# Patient Record
Sex: MALE | Race: WHITE | NOT HISPANIC OR LATINO | Employment: OTHER | URBAN - METROPOLITAN AREA
[De-identification: names, ages, dates, MRNs, and addresses within clinical notes are randomized per-mention and may not be internally consistent; named-entity substitution may affect disease eponyms.]

---

## 2017-01-30 ENCOUNTER — HOSPITAL ENCOUNTER (OUTPATIENT)
Dept: ONCOLOGY | Facility: CLINIC | Age: 77
Discharge: HOME | End: 2017-01-30
Attending: INTERNAL MEDICINE | Admitting: INTERNAL MEDICINE

## 2017-01-30 LAB
ALBUMIN SERPL-MCNC: 3.7 G/DL (ref 3.5–4.8)
ALBUMIN/GLOB SERPL: 1.3 {RATIO} (ref 1–1.7)
ALP SERPL-CCNC: 85 IU/L (ref 32–91)
ALT SERPL-CCNC: 11 IU/L (ref 17–63)
ANION GAP SERPL CALC-SCNC: 14 MMOL/L (ref 10–20)
AST SERPL-CCNC: 19 IU/L (ref 15–41)
BILIRUB SERPL-MCNC: 0.9 MG/DL (ref 0.3–1.2)
BUN SERPL-MCNC: 10 MG/DL (ref 8–20)
BUN/CREAT SERPL: 7.1 (ref 6.2–20.3)
CALCIUM SERPL-MCNC: 9 MG/DL (ref 8.9–10.3)
CHLORIDE SERPL-SCNC: 105 MMOL/L (ref 101–111)
CONV CO2: 27 MMOL/L (ref 22–32)
CONV TOTAL PROTEIN: 6.5 G/DL (ref 6.1–7.9)
CREAT UR-MCNC: 1.4 MG/DL (ref 0.7–1.2)
GLOBULIN UR ELPH-MCNC: 2.8 G/DL (ref 2.5–3.8)
GLUCOSE SERPL-MCNC: 106 MG/DL (ref 65–99)
POTASSIUM SERPL-SCNC: 4 MMOL/L (ref 3.6–5.1)
SODIUM SERPL-SCNC: 142 MMOL/L (ref 136–144)

## 2017-02-02 ENCOUNTER — HOSPITAL ENCOUNTER (OUTPATIENT)
Dept: ONCOLOGY | Facility: CLINIC | Age: 77
Discharge: HOME | End: 2017-02-02
Attending: INTERNAL MEDICINE | Admitting: INTERNAL MEDICINE

## 2017-02-02 LAB
ALBUMIN SERPL-MCNC: 3.3 G/DL (ref 3.5–4.8)
ALBUMIN/GLOB SERPL: 1.3 {RATIO} (ref 1–1.7)
ALP SERPL-CCNC: 81 IU/L (ref 32–91)
ALT SERPL-CCNC: 11 IU/L (ref 17–63)
ANION GAP SERPL CALC-SCNC: 13.9 MMOL/L (ref 10–20)
AST SERPL-CCNC: 19 IU/L (ref 15–41)
BILIRUB SERPL-MCNC: 1 MG/DL (ref 0.3–1.2)
BUN SERPL-MCNC: 8 MG/DL (ref 8–20)
BUN/CREAT SERPL: 6.2 (ref 6.2–20.3)
CALCIUM SERPL-MCNC: 8.2 MG/DL (ref 8.9–10.3)
CHLORIDE SERPL-SCNC: 106 MMOL/L (ref 101–111)
CONV CO2: 25 MMOL/L (ref 22–32)
CONV TOTAL PROTEIN: 5.9 G/DL (ref 6.1–7.9)
CREAT UR-MCNC: 1.3 MG/DL (ref 0.7–1.2)
GLOBULIN UR ELPH-MCNC: 2.6 G/DL (ref 2.5–3.8)
GLUCOSE SERPL-MCNC: 185 MG/DL (ref 65–99)
POTASSIUM SERPL-SCNC: 3.9 MMOL/L (ref 3.6–5.1)
SODIUM SERPL-SCNC: 141 MMOL/L (ref 136–144)

## 2017-02-28 ENCOUNTER — HOSPITAL ENCOUNTER (OUTPATIENT)
Dept: ONCOLOGY | Facility: CLINIC | Age: 77
Discharge: HOME | End: 2017-02-28
Attending: INTERNAL MEDICINE | Admitting: INTERNAL MEDICINE

## 2017-03-06 ENCOUNTER — HOSPITAL ENCOUNTER (OUTPATIENT)
Dept: ONCOLOGY | Facility: CLINIC | Age: 77
Discharge: HOME | End: 2017-03-06
Attending: INTERNAL MEDICINE | Admitting: INTERNAL MEDICINE

## 2017-03-28 ENCOUNTER — HOSPITAL ENCOUNTER (OUTPATIENT)
Dept: ONCOLOGY | Facility: CLINIC | Age: 77
Discharge: HOME | End: 2017-03-28
Attending: NURSE PRACTITIONER | Admitting: NURSE PRACTITIONER

## 2017-04-06 ENCOUNTER — HOSPITAL ENCOUNTER (OUTPATIENT)
Dept: ONCOLOGY | Facility: CLINIC | Age: 77
Discharge: HOME | End: 2017-04-06
Attending: INTERNAL MEDICINE | Admitting: INTERNAL MEDICINE

## 2017-04-06 LAB
ALBUMIN SERPL-MCNC: 3.5 G/DL (ref 3.5–4.8)
ALBUMIN/GLOB SERPL: 1.5 {RATIO} (ref 1–1.7)
ALP SERPL-CCNC: 73 IU/L (ref 32–91)
ALT SERPL-CCNC: 11 IU/L (ref 17–63)
ANION GAP SERPL CALC-SCNC: 12.7 MMOL/L (ref 10–20)
AST SERPL-CCNC: 16 IU/L (ref 15–41)
BILIRUB SERPL-MCNC: 1.1 MG/DL (ref 0.3–1.2)
BUN SERPL-MCNC: 9 MG/DL (ref 8–20)
BUN/CREAT SERPL: 6.9 (ref 6.2–20.3)
CALCIUM SERPL-MCNC: 8.9 MG/DL (ref 8.9–10.3)
CHLORIDE SERPL-SCNC: 105 MMOL/L (ref 101–111)
CONV CO2: 26 MMOL/L (ref 22–32)
CONV TOTAL PROTEIN: 5.9 G/DL (ref 6.1–7.9)
CREAT UR-MCNC: 1.3 MG/DL (ref 0.7–1.2)
GLOBULIN UR ELPH-MCNC: 2.4 G/DL (ref 2.5–3.8)
GLUCOSE SERPL-MCNC: 115 MG/DL (ref 65–99)
POTASSIUM SERPL-SCNC: 3.7 MMOL/L (ref 3.6–5.1)
SODIUM SERPL-SCNC: 140 MMOL/L (ref 136–144)

## 2017-04-13 ENCOUNTER — HOSPITAL ENCOUNTER (OUTPATIENT)
Dept: ONCOLOGY | Facility: CLINIC | Age: 77
Discharge: HOME | End: 2017-04-13
Attending: INTERNAL MEDICINE | Admitting: INTERNAL MEDICINE

## 2017-04-25 ENCOUNTER — HOSPITAL ENCOUNTER (OUTPATIENT)
Dept: ONCOLOGY | Facility: CLINIC | Age: 77
Discharge: HOME | End: 2017-04-25
Attending: INTERNAL MEDICINE | Admitting: INTERNAL MEDICINE

## 2017-05-30 ENCOUNTER — HOSPITAL ENCOUNTER (OUTPATIENT)
Dept: ONCOLOGY | Facility: CLINIC | Age: 77
Discharge: HOME | End: 2017-05-30
Attending: NURSE PRACTITIONER | Admitting: NURSE PRACTITIONER

## 2017-06-08 ENCOUNTER — HOSPITAL ENCOUNTER (OUTPATIENT)
Dept: ONCOLOGY | Facility: CLINIC | Age: 77
Discharge: HOME | End: 2017-06-08
Attending: INTERNAL MEDICINE | Admitting: INTERNAL MEDICINE

## 2017-06-08 LAB
ALBUMIN SERPL-MCNC: 3.6 G/DL (ref 3.5–4.8)
ALBUMIN/GLOB SERPL: 1.6 {RATIO} (ref 1–1.7)
ALP SERPL-CCNC: 70 IU/L (ref 32–91)
ALT SERPL-CCNC: 12 IU/L (ref 17–63)
ANION GAP SERPL CALC-SCNC: 14.9 MMOL/L (ref 10–20)
AST SERPL-CCNC: 17 IU/L (ref 15–41)
BILIRUB SERPL-MCNC: 0.9 MG/DL (ref 0.3–1.2)
BUN SERPL-MCNC: 12 MG/DL (ref 8–20)
BUN/CREAT SERPL: 10 (ref 6.2–20.3)
CALCIUM SERPL-MCNC: 8.7 MG/DL (ref 8.9–10.3)
CHLORIDE SERPL-SCNC: 104 MMOL/L (ref 101–111)
CONV CO2: 26 MMOL/L (ref 22–32)
CONV TOTAL PROTEIN: 5.9 G/DL (ref 6.1–7.9)
CREAT UR-MCNC: 1.2 MG/DL (ref 0.7–1.2)
GLOBULIN UR ELPH-MCNC: 2.3 G/DL (ref 2.5–3.8)
GLUCOSE SERPL-MCNC: 117 MG/DL (ref 65–99)
POTASSIUM SERPL-SCNC: 3.9 MMOL/L (ref 3.6–5.1)
SODIUM SERPL-SCNC: 141 MMOL/L (ref 136–144)

## 2017-07-07 ENCOUNTER — HOSPITAL ENCOUNTER (OUTPATIENT)
Dept: ONCOLOGY | Facility: CLINIC | Age: 77
Discharge: HOME | End: 2017-07-07
Attending: INTERNAL MEDICINE | Admitting: INTERNAL MEDICINE

## 2017-08-09 ENCOUNTER — HOSPITAL ENCOUNTER (OUTPATIENT)
Dept: ONCOLOGY | Facility: CLINIC | Age: 77
Discharge: HOME | End: 2017-08-09
Attending: INTERNAL MEDICINE | Admitting: INTERNAL MEDICINE

## 2017-08-11 ENCOUNTER — HOSPITAL ENCOUNTER (OUTPATIENT)
Dept: ONCOLOGY | Facility: CLINIC | Age: 77
Discharge: HOME | End: 2017-08-11
Attending: INTERNAL MEDICINE | Admitting: INTERNAL MEDICINE

## 2017-08-11 LAB
ALBUMIN SERPL-MCNC: 3.4 G/DL (ref 3.5–4.8)
ALBUMIN/GLOB SERPL: 1.4 {RATIO} (ref 1–1.7)
ALP SERPL-CCNC: 66 IU/L (ref 32–91)
ALT SERPL-CCNC: 14 IU/L (ref 17–63)
ANION GAP SERPL CALC-SCNC: 12.8 MMOL/L (ref 10–20)
AST SERPL-CCNC: 16 IU/L (ref 15–41)
BILIRUB SERPL-MCNC: 1.2 MG/DL (ref 0.3–1.2)
BUN SERPL-MCNC: 12 MG/DL (ref 8–20)
BUN/CREAT SERPL: 9.2 (ref 6.2–20.3)
CALCIUM SERPL-MCNC: 8.7 MG/DL (ref 8.9–10.3)
CHLORIDE SERPL-SCNC: 105 MMOL/L (ref 101–111)
CONV CO2: 26 MMOL/L (ref 22–32)
CONV TOTAL PROTEIN: 5.9 G/DL (ref 6.1–7.9)
CREAT UR-MCNC: 1.3 MG/DL (ref 0.7–1.2)
GLOBULIN UR ELPH-MCNC: 2.5 G/DL (ref 2.5–3.8)
GLUCOSE SERPL-MCNC: 102 MG/DL (ref 65–99)
POTASSIUM SERPL-SCNC: 3.8 MMOL/L (ref 3.6–5.1)
SODIUM SERPL-SCNC: 140 MMOL/L (ref 136–144)

## 2017-09-13 ENCOUNTER — HOSPITAL ENCOUNTER (OUTPATIENT)
Dept: ONCOLOGY | Facility: CLINIC | Age: 77
Setting detail: INFUSION SERIES
Discharge: HOME | End: 2017-09-13
Attending: INTERNAL MEDICINE | Admitting: INTERNAL MEDICINE

## 2017-09-13 ENCOUNTER — HOSPITAL ENCOUNTER (OUTPATIENT)
Dept: ONCOLOGY | Facility: HOSPITAL | Age: 77
Discharge: HOME | End: 2017-09-13
Attending: INTERNAL MEDICINE | Admitting: INTERNAL MEDICINE

## 2017-09-13 ENCOUNTER — CLINICAL SUPPORT (OUTPATIENT)
Dept: ONCOLOGY | Facility: HOSPITAL | Age: 77
End: 2017-09-13

## 2017-09-13 NOTE — PROGRESS NOTES
PATIENTS ONCOLOGY RECORD LOCATED IN [facility]      Subjective     Name:  LAUREN LEDEZMA     Date:  2017  Address:  [address] [place] [address]  Home: [phone]  :  1940 AGE:  77 y.o.        RECORDS OBTAINED:  Patients Oncology Record is located in [facility]

## 2017-10-03 ENCOUNTER — HOSPITAL ENCOUNTER (OUTPATIENT)
Dept: ONCOLOGY | Facility: HOSPITAL | Age: 77
Discharge: HOME | End: 2017-10-03
Attending: NURSE PRACTITIONER | Admitting: NURSE PRACTITIONER

## 2017-10-03 ENCOUNTER — HOSPITAL ENCOUNTER (OUTPATIENT)
Dept: ONCOLOGY | Facility: CLINIC | Age: 77
Setting detail: INFUSION SERIES
Discharge: HOME | End: 2017-10-03
Attending: NURSE PRACTITIONER | Admitting: NURSE PRACTITIONER

## 2017-10-03 ENCOUNTER — CLINICAL SUPPORT (OUTPATIENT)
Dept: ONCOLOGY | Facility: HOSPITAL | Age: 77
End: 2017-10-03

## 2017-10-03 NOTE — PROGRESS NOTES
PATIENTS ONCOLOGY RECORD LOCATED IN [facility]      Subjective     Name:  LAUREN LEDEZMA     Date:  10/03/2017  Address:  [address] [place] [address]  Home: [phone]  :  1940 AGE:  77 y.o.        RECORDS OBTAINED:  Patients Oncology Record is located in [facility]

## 2017-10-13 ENCOUNTER — HOSPITAL ENCOUNTER (OUTPATIENT)
Dept: ONCOLOGY | Facility: CLINIC | Age: 77
Setting detail: INFUSION SERIES
Discharge: HOME | End: 2017-10-13
Attending: INTERNAL MEDICINE | Admitting: INTERNAL MEDICINE

## 2017-10-13 ENCOUNTER — CLINICAL SUPPORT (OUTPATIENT)
Dept: ONCOLOGY | Facility: HOSPITAL | Age: 77
End: 2017-10-13

## 2017-10-13 ENCOUNTER — HOSPITAL ENCOUNTER (OUTPATIENT)
Dept: ONCOLOGY | Facility: HOSPITAL | Age: 77
Discharge: HOME | End: 2017-10-13
Attending: INTERNAL MEDICINE | Admitting: INTERNAL MEDICINE

## 2017-10-13 LAB
ALBUMIN SERPL-MCNC: 3.5 G/DL (ref 3.5–4.8)
ALBUMIN/GLOB SERPL: 1.4 {RATIO} (ref 1–1.7)
ALP SERPL-CCNC: 71 IU/L (ref 32–91)
ALT SERPL-CCNC: 12 IU/L (ref 17–63)
ANION GAP SERPL CALC-SCNC: 13.1 MMOL/L (ref 10–20)
AST SERPL-CCNC: 18 IU/L (ref 15–41)
BILIRUB SERPL-MCNC: 1.1 MG/DL (ref 0.3–1.2)
BUN SERPL-MCNC: 12 MG/DL (ref 8–20)
BUN/CREAT SERPL: 10 (ref 6.2–20.3)
CALCIUM SERPL-MCNC: 8.7 MG/DL (ref 8.9–10.3)
CHLORIDE SERPL-SCNC: 101 MMOL/L (ref 101–111)
CONV CO2: 27 MMOL/L (ref 22–32)
CONV TOTAL PROTEIN: 6 G/DL (ref 6.1–7.9)
CREAT UR-MCNC: 1.2 MG/DL (ref 0.7–1.2)
GLOBULIN UR ELPH-MCNC: 2.5 G/DL (ref 2.5–3.8)
GLUCOSE SERPL-MCNC: 97 MG/DL (ref 65–99)
POTASSIUM SERPL-SCNC: 4.1 MMOL/L (ref 3.6–5.1)
SODIUM SERPL-SCNC: 137 MMOL/L (ref 136–144)

## 2017-10-13 NOTE — PROGRESS NOTES
PATIENTS ONCOLOGY RECORD LOCATED IN [facility]      Subjective     Name:  LAUREN LEDEZMA     Date:  10/13/2017  Address:  [address] [place] [address]  Home: [phone]  :  1940 AGE:  77 y.o.        RECORDS OBTAINED:  Patients Oncology Record is located in [facility]

## 2017-11-10 ENCOUNTER — HOSPITAL ENCOUNTER (OUTPATIENT)
Dept: ONCOLOGY | Facility: CLINIC | Age: 77
Setting detail: INFUSION SERIES
Discharge: HOME | End: 2017-11-10
Attending: INTERNAL MEDICINE | Admitting: INTERNAL MEDICINE

## 2017-11-10 ENCOUNTER — CLINICAL SUPPORT (OUTPATIENT)
Dept: ONCOLOGY | Facility: HOSPITAL | Age: 77
End: 2017-11-10

## 2017-11-10 ENCOUNTER — HOSPITAL ENCOUNTER (OUTPATIENT)
Dept: ONCOLOGY | Facility: HOSPITAL | Age: 77
Discharge: HOME | End: 2017-11-10
Attending: INTERNAL MEDICINE | Admitting: INTERNAL MEDICINE

## 2017-11-10 NOTE — PROGRESS NOTES
PATIENTS ONCOLOGY RECORD LOCATED IN [facility]      Subjective     Name:  LAUREN LEDEZMA     Date:  11/10/2017  Address:  [address] [place] [address]  Home: [phone]  :  1940 AGE:  77 y.o.        RECORDS OBTAINED:  Patients Oncology Record is located in [facility]

## 2017-12-01 ENCOUNTER — HOSPITAL ENCOUNTER (OUTPATIENT)
Dept: ONCOLOGY | Facility: CLINIC | Age: 77
Setting detail: INFUSION SERIES
Discharge: HOME | End: 2017-12-01
Attending: NURSE PRACTITIONER | Admitting: NURSE PRACTITIONER

## 2017-12-01 ENCOUNTER — HOSPITAL ENCOUNTER (OUTPATIENT)
Dept: ONCOLOGY | Facility: HOSPITAL | Age: 77
Discharge: HOME | End: 2017-12-01
Attending: NURSE PRACTITIONER | Admitting: NURSE PRACTITIONER

## 2017-12-01 ENCOUNTER — CLINICAL SUPPORT (OUTPATIENT)
Dept: ONCOLOGY | Facility: HOSPITAL | Age: 77
End: 2017-12-01

## 2017-12-01 LAB
ALBUMIN SERPL-MCNC: 3.7 G/DL (ref 3.5–4.8)
ALBUMIN/GLOB SERPL: 1.5 {RATIO} (ref 1–1.7)
ALP SERPL-CCNC: 68 IU/L (ref 32–91)
ALT SERPL-CCNC: 13 IU/L (ref 17–63)
ANION GAP SERPL CALC-SCNC: 10.5 MMOL/L (ref 10–20)
AST SERPL-CCNC: 19 IU/L (ref 15–41)
BILIRUB SERPL-MCNC: 1.1 MG/DL (ref 0.3–1.2)
BUN SERPL-MCNC: 9 MG/DL (ref 8–20)
BUN/CREAT SERPL: 6.9 (ref 6.2–20.3)
CALCIUM SERPL-MCNC: 8.8 MG/DL (ref 8.9–10.3)
CHLORIDE SERPL-SCNC: 106 MMOL/L (ref 101–111)
CONV CO2: 25 MMOL/L (ref 22–32)
CONV TOTAL PROTEIN: 6.2 G/DL (ref 6.1–7.9)
CREAT UR-MCNC: 1.3 MG/DL (ref 0.7–1.2)
GLOBULIN UR ELPH-MCNC: 2.5 G/DL (ref 2.5–3.8)
GLUCOSE SERPL-MCNC: 151 MG/DL (ref 65–99)
MAGNESIUM SERPL-MCNC: 1.9 MG/DL (ref 1.8–2.5)
POTASSIUM SERPL-SCNC: 3.5 MMOL/L (ref 3.6–5.1)
SODIUM SERPL-SCNC: 138 MMOL/L (ref 136–144)

## 2017-12-01 NOTE — PROGRESS NOTES
PATIENTS ONCOLOGY RECORD LOCATED IN [facility]      Subjective     Name:  LAUREN LEDEZMA     Date:  2017  Address:  [address] [place] [address]  Home: [phone]  :  1940 AGE:  77 y.o.        RECORDS OBTAINED:  Patients Oncology Record is located in [facility]

## 2017-12-08 ENCOUNTER — CLINICAL SUPPORT (OUTPATIENT)
Dept: ONCOLOGY | Facility: HOSPITAL | Age: 77
End: 2017-12-08

## 2017-12-08 ENCOUNTER — HOSPITAL ENCOUNTER (OUTPATIENT)
Dept: ONCOLOGY | Facility: HOSPITAL | Age: 77
Discharge: HOME | End: 2017-12-08
Attending: INTERNAL MEDICINE | Admitting: INTERNAL MEDICINE

## 2017-12-08 ENCOUNTER — HOSPITAL ENCOUNTER (OUTPATIENT)
Dept: ONCOLOGY | Facility: CLINIC | Age: 77
Setting detail: INFUSION SERIES
Discharge: HOME | End: 2017-12-08
Attending: INTERNAL MEDICINE | Admitting: INTERNAL MEDICINE

## 2017-12-08 LAB
ALBUMIN SERPL-MCNC: 3.8 G/DL (ref 3.5–4.8)
ALBUMIN/GLOB SERPL: 1.8 {RATIO} (ref 1–1.7)
ALP SERPL-CCNC: 69 IU/L (ref 32–91)
ALT SERPL-CCNC: 16 IU/L (ref 17–63)
ANION GAP SERPL CALC-SCNC: 16 MMOL/L (ref 10–20)
AST SERPL-CCNC: 23 IU/L (ref 15–41)
BILIRUB SERPL-MCNC: 1.2 MG/DL (ref 0.3–1.2)
BUN SERPL-MCNC: 10 MG/DL (ref 8–20)
BUN/CREAT SERPL: 8.3 (ref 6.2–20.3)
CALCIUM SERPL-MCNC: 8.8 MG/DL (ref 8.9–10.3)
CHLORIDE SERPL-SCNC: 100 MMOL/L (ref 101–111)
CONV CO2: 24 MMOL/L (ref 22–32)
CONV TOTAL PROTEIN: 5.9 G/DL (ref 6.1–7.9)
CREAT UR-MCNC: 1.2 MG/DL (ref 0.7–1.2)
GLOBULIN UR ELPH-MCNC: 2.1 G/DL (ref 2.5–3.8)
GLUCOSE SERPL-MCNC: 113 MG/DL (ref 65–99)
POTASSIUM SERPL-SCNC: 4 MMOL/L (ref 3.6–5.1)
SODIUM SERPL-SCNC: 136 MMOL/L (ref 136–144)

## 2017-12-08 NOTE — PROGRESS NOTES
PATIENTS ONCOLOGY RECORD LOCATED IN [facility]      Subjective     Name:  LAUREN LEDEZMA     Date:  2017  Address:  [address] [place] [address]  Home: [phone]  :  1940 AGE:  77 y.o.        RECORDS OBTAINED:  Patients Oncology Record is located in [facility]

## 2018-01-15 ENCOUNTER — HOSPITAL ENCOUNTER (OUTPATIENT)
Dept: ONCOLOGY | Facility: HOSPITAL | Age: 78
Discharge: HOME | End: 2018-01-15
Attending: INTERNAL MEDICINE | Admitting: INTERNAL MEDICINE

## 2018-01-15 ENCOUNTER — HOSPITAL ENCOUNTER (OUTPATIENT)
Dept: ONCOLOGY | Facility: CLINIC | Age: 78
Setting detail: INFUSION SERIES
Discharge: HOME | End: 2018-01-15
Attending: INTERNAL MEDICINE | Admitting: INTERNAL MEDICINE

## 2018-01-15 ENCOUNTER — CLINICAL SUPPORT (OUTPATIENT)
Dept: ONCOLOGY | Facility: HOSPITAL | Age: 78
End: 2018-01-15

## 2018-01-15 NOTE — PROGRESS NOTES
PATIENTS ONCOLOGY RECORD LOCATED IN [facility]      Subjective     Name:  LAUREN GHOSH DARIEN     Date:  01/15/2018  Address:  [address] [place] [address]  Home: [phone]  :  1940 AGE:  77 y.o.        RECORDS OBTAINED:  Patients Oncology Record is located in [facility]

## 2018-02-01 ENCOUNTER — CLINICAL SUPPORT (OUTPATIENT)
Dept: ONCOLOGY | Facility: HOSPITAL | Age: 78
End: 2018-02-01

## 2018-02-01 ENCOUNTER — HOSPITAL ENCOUNTER (OUTPATIENT)
Dept: ONCOLOGY | Facility: CLINIC | Age: 78
Setting detail: INFUSION SERIES
Discharge: HOME | End: 2018-02-01
Attending: INTERNAL MEDICINE | Admitting: INTERNAL MEDICINE

## 2018-02-05 ENCOUNTER — CLINICAL SUPPORT (OUTPATIENT)
Dept: ONCOLOGY | Facility: HOSPITAL | Age: 78
End: 2018-02-05

## 2018-02-05 ENCOUNTER — HOSPITAL ENCOUNTER (OUTPATIENT)
Dept: ONCOLOGY | Facility: CLINIC | Age: 78
Setting detail: INFUSION SERIES
Discharge: HOME | End: 2018-02-05
Attending: INTERNAL MEDICINE | Admitting: INTERNAL MEDICINE

## 2018-02-05 NOTE — PROGRESS NOTES
PATIENTS ONCOLOGY RECORD LOCATED IN [facility]      Subjective     Name:  LAUREN GHOSH DARIEN     Date:  2018  Address:   [address] [place] [address]  Home: [phone]  :  1940 AGE:  77 y.o.        RECORDS OBTAINED:  Patients Oncology Record is located in [facility]

## 2018-02-08 ENCOUNTER — HOSPITAL ENCOUNTER (OUTPATIENT)
Dept: ONCOLOGY | Facility: HOSPITAL | Age: 78
Discharge: HOME | End: 2018-02-08
Attending: INTERNAL MEDICINE | Admitting: INTERNAL MEDICINE

## 2018-02-08 ENCOUNTER — CLINICAL SUPPORT (OUTPATIENT)
Dept: ONCOLOGY | Facility: HOSPITAL | Age: 78
End: 2018-02-08

## 2018-02-08 ENCOUNTER — HOSPITAL ENCOUNTER (OUTPATIENT)
Dept: ONCOLOGY | Facility: CLINIC | Age: 78
Setting detail: INFUSION SERIES
Discharge: HOME | End: 2018-02-08
Attending: INTERNAL MEDICINE | Admitting: INTERNAL MEDICINE

## 2018-02-08 LAB
ALBUMIN SERPL-MCNC: 3.6 G/DL (ref 3.5–4.8)
ALBUMIN/GLOB SERPL: 1.5 {RATIO} (ref 1–1.7)
ALP SERPL-CCNC: 68 IU/L (ref 32–91)
ALT SERPL-CCNC: 12 IU/L (ref 17–63)
ANION GAP SERPL CALC-SCNC: 11.6 MMOL/L (ref 10–20)
AST SERPL-CCNC: 17 IU/L (ref 15–41)
BILIRUB SERPL-MCNC: 1.6 MG/DL (ref 0.3–1.2)
BUN SERPL-MCNC: 16 MG/DL (ref 8–20)
BUN/CREAT SERPL: 14.5 (ref 6.2–20.3)
CALCIUM SERPL-MCNC: 8.5 MG/DL (ref 8.9–10.3)
CHLORIDE SERPL-SCNC: 103 MMOL/L (ref 101–111)
CONV CO2: 26 MMOL/L (ref 22–32)
CONV TOTAL PROTEIN: 6 G/DL (ref 6.1–7.9)
CREAT UR-MCNC: 1.1 MG/DL (ref 0.7–1.2)
GLOBULIN UR ELPH-MCNC: 2.4 G/DL (ref 2.5–3.8)
GLUCOSE SERPL-MCNC: 99 MG/DL (ref 65–99)
POTASSIUM SERPL-SCNC: 3.6 MMOL/L (ref 3.6–5.1)
SODIUM SERPL-SCNC: 137 MMOL/L (ref 136–144)

## 2018-02-08 NOTE — PROGRESS NOTES
PATIENTS ONCOLOGY RECORD LOCATED IN [facility]      Subjective     Name:  LAUREN GHOSH DARIEN     Date:  2018  Address:  [address] [place] [address]  Home: [phone]  :  1940 AGE:  77 y.o.        RECORDS OBTAINED:  Patients Oncology Record is located in [facility]

## 2018-02-15 ENCOUNTER — HOSPITAL ENCOUNTER (OUTPATIENT)
Dept: CARDIOLOGY | Facility: HOSPITAL | Age: 78
Discharge: HOME | End: 2018-02-15
Attending: INTERNAL MEDICINE | Admitting: INTERNAL MEDICINE

## 2018-02-28 ENCOUNTER — CLINICAL SUPPORT (OUTPATIENT)
Dept: ONCOLOGY | Facility: HOSPITAL | Age: 78
End: 2018-02-28

## 2018-02-28 ENCOUNTER — HOSPITAL ENCOUNTER (OUTPATIENT)
Dept: ONCOLOGY | Facility: CLINIC | Age: 78
Setting detail: INFUSION SERIES
Discharge: HOME | End: 2018-02-28
Attending: INTERNAL MEDICINE | Admitting: INTERNAL MEDICINE

## 2018-02-28 ENCOUNTER — HOSPITAL ENCOUNTER (OUTPATIENT)
Dept: ONCOLOGY | Facility: CLINIC | Age: 78
Discharge: HOME | End: 2018-02-28
Attending: INTERNAL MEDICINE | Admitting: INTERNAL MEDICINE

## 2018-02-28 NOTE — PROGRESS NOTES
PATIENTS ONCOLOGY RECORD LOCATED IN [facility]      Subjective     Name:  LAUREN GHOSH DARIEN     Date:  2018  Address:  [address] [place] [address]  Home: [phone]  :  1940 AGE:  78 y.o.        RECORDS OBTAINED:  Patients Oncology Record is located in [facility]

## 2018-03-02 ENCOUNTER — CLINICAL SUPPORT (OUTPATIENT)
Dept: ONCOLOGY | Facility: HOSPITAL | Age: 78
End: 2018-03-02

## 2018-03-02 ENCOUNTER — HOSPITAL ENCOUNTER (OUTPATIENT)
Dept: ONCOLOGY | Facility: CLINIC | Age: 78
Setting detail: INFUSION SERIES
Discharge: HOME | End: 2018-03-02
Attending: INTERNAL MEDICINE | Admitting: INTERNAL MEDICINE

## 2018-03-02 NOTE — PROGRESS NOTES
PATIENTS ONCOLOGY RECORD LOCATED IN [facility]      Subjective     Name:  LAUREN GHOSH DARIEN     Date:  2018  Address:  [address] [place] [address]  Home: [phone]  :  1940 AGE:  78 y.o.        RECORDS OBTAINED:  Patients Oncology Record is located in [facility]

## 2018-03-08 ENCOUNTER — HOSPITAL ENCOUNTER (OUTPATIENT)
Dept: ONCOLOGY | Facility: CLINIC | Age: 78
Setting detail: INFUSION SERIES
Discharge: HOME | End: 2018-03-08
Attending: INTERNAL MEDICINE | Admitting: INTERNAL MEDICINE

## 2018-03-08 ENCOUNTER — CLINICAL SUPPORT (OUTPATIENT)
Dept: ONCOLOGY | Facility: HOSPITAL | Age: 78
End: 2018-03-08

## 2018-03-08 NOTE — PROGRESS NOTES
PATIENTS ONCOLOGY RECORD LOCATED IN [facility]      Subjective     Name:  LAUREN GHOSH DARIEN     Date:  2018  Address:  [address] [place] [address]  Home: [phone]  :  1940 AGE:  78 y.o.        RECORDS OBTAINED:  Patients Oncology Record is located in [facility]

## 2018-03-28 ENCOUNTER — HOSPITAL ENCOUNTER (OUTPATIENT)
Dept: ONCOLOGY | Facility: CLINIC | Age: 78
Setting detail: INFUSION SERIES
Discharge: HOME | End: 2018-03-28
Attending: INTERNAL MEDICINE | Admitting: INTERNAL MEDICINE

## 2018-03-28 ENCOUNTER — CLINICAL SUPPORT (OUTPATIENT)
Dept: ONCOLOGY | Facility: HOSPITAL | Age: 78
End: 2018-03-28

## 2018-03-28 NOTE — PROGRESS NOTES
PATIENTS ONCOLOGY RECORD LOCATED IN [facility]      Subjective     Name:  LAUREN GHOSH DARIEN     Date:  2018  Address:  [address] [place] [address]  Home: [phone]  :  1940 AGE:  78 y.o.        RECORDS OBTAINED:  Patients Oncology Record is located in [facility]

## 2018-03-30 ENCOUNTER — CLINICAL SUPPORT (OUTPATIENT)
Dept: ONCOLOGY | Facility: HOSPITAL | Age: 78
End: 2018-03-30

## 2018-03-30 ENCOUNTER — HOSPITAL ENCOUNTER (OUTPATIENT)
Dept: ONCOLOGY | Facility: CLINIC | Age: 78
Setting detail: INFUSION SERIES
Discharge: HOME | End: 2018-03-30
Attending: INTERNAL MEDICINE | Admitting: INTERNAL MEDICINE

## 2018-03-30 NOTE — PROGRESS NOTES
PATIENTS ONCOLOGY RECORD LOCATED IN [facility]      Subjective     Name:  LAUREN GHOSH DARIEN     Date:  2018  Address:  [address] [place] [address]  Home: [phone]  :  1940 AGE:  78 y.o.        RECORDS OBTAINED:  Patients Oncology Record is located in [facility]

## 2018-04-02 ENCOUNTER — CLINICAL SUPPORT (OUTPATIENT)
Dept: ONCOLOGY | Facility: HOSPITAL | Age: 78
End: 2018-04-02

## 2018-04-02 ENCOUNTER — HOSPITAL ENCOUNTER (OUTPATIENT)
Dept: ONCOLOGY | Facility: CLINIC | Age: 78
Setting detail: INFUSION SERIES
Discharge: HOME | End: 2018-04-02
Attending: INTERNAL MEDICINE | Admitting: INTERNAL MEDICINE

## 2018-04-02 NOTE — PROGRESS NOTES
PATIENTS ONCOLOGY RECORD LOCATED IN [facility]      Subjective     Name:  LAUREN GHOSH DARIEN     Date:  2018  Address:   [address] [place] [address]  Home: [phone]  :  1940 AGE:  78 y.o.        RECORDS OBTAINED:  Patients Oncology Record is located in [facility]

## 2018-04-06 ENCOUNTER — HOSPITAL ENCOUNTER (OUTPATIENT)
Dept: ONCOLOGY | Facility: CLINIC | Age: 78
Setting detail: INFUSION SERIES
Discharge: HOME | End: 2018-04-06
Attending: INTERNAL MEDICINE | Admitting: INTERNAL MEDICINE

## 2018-04-06 ENCOUNTER — CLINICAL SUPPORT (OUTPATIENT)
Dept: ONCOLOGY | Facility: HOSPITAL | Age: 78
End: 2018-04-06

## 2018-04-06 ENCOUNTER — HOSPITAL ENCOUNTER (OUTPATIENT)
Dept: ONCOLOGY | Facility: HOSPITAL | Age: 78
Discharge: HOME | End: 2018-04-06
Attending: INTERNAL MEDICINE | Admitting: INTERNAL MEDICINE

## 2018-04-06 LAB
ALBUMIN SERPL-MCNC: 3.6 G/DL (ref 3.5–4.8)
ALBUMIN/GLOB SERPL: 1.6 {RATIO} (ref 1–1.7)
ALP SERPL-CCNC: 64 IU/L (ref 32–91)
ALT SERPL-CCNC: 9 IU/L (ref 17–63)
ANION GAP SERPL CALC-SCNC: 10.8 MMOL/L (ref 10–20)
AST SERPL-CCNC: 17 IU/L (ref 15–41)
BILIRUB SERPL-MCNC: 1.3 MG/DL (ref 0.3–1.2)
BUN SERPL-MCNC: 15 MG/DL (ref 8–20)
BUN/CREAT SERPL: 12.5 (ref 6.2–20.3)
CALCIUM SERPL-MCNC: 8.4 MG/DL (ref 8.9–10.3)
CHLORIDE SERPL-SCNC: 101 MMOL/L (ref 101–111)
CONV CO2: 25 MMOL/L (ref 22–32)
CONV TOTAL PROTEIN: 5.8 G/DL (ref 6.1–7.9)
CREAT UR-MCNC: 1.2 MG/DL (ref 0.7–1.2)
GLOBULIN UR ELPH-MCNC: 2.2 G/DL (ref 2.5–3.8)
GLUCOSE SERPL-MCNC: 132 MG/DL (ref 65–99)
POTASSIUM SERPL-SCNC: 3.8 MMOL/L (ref 3.6–5.1)
SODIUM SERPL-SCNC: 133 MMOL/L (ref 136–144)

## 2018-04-06 NOTE — PROGRESS NOTES
PATIENTS ONCOLOGY RECORD LOCATED IN [facility]      Subjective     Name:  LAUREN GHOSH DARIEN     Date:  2018  Address:   [address] [place] [address]  Home: [phone]  :  1940 AGE:  78 y.o.        RECORDS OBTAINED:  Patients Oncology Record is located in [facility]

## 2018-05-02 ENCOUNTER — CLINICAL SUPPORT (OUTPATIENT)
Dept: ONCOLOGY | Facility: HOSPITAL | Age: 78
End: 2018-05-02

## 2018-05-02 ENCOUNTER — HOSPITAL ENCOUNTER (OUTPATIENT)
Dept: ONCOLOGY | Facility: CLINIC | Age: 78
Setting detail: INFUSION SERIES
Discharge: HOME | End: 2018-05-02
Attending: INTERNAL MEDICINE | Admitting: INTERNAL MEDICINE

## 2018-05-02 NOTE — PROGRESS NOTES
PATIENTS ONCOLOGY RECORD LOCATED IN [facility]      Subjective     Name:  LAUREN GHOSH DARIEN     Date:  2018  Address:   [address] [place] [address]  Home: [phone]  :  1940 AGE:  78 y.o.        RECORDS OBTAINED:  Patients Oncology Record is located in [facility]

## 2018-05-15 ENCOUNTER — HOSPITAL ENCOUNTER (OUTPATIENT)
Dept: ONCOLOGY | Facility: HOSPITAL | Age: 78
Discharge: HOME | End: 2018-05-15
Attending: INTERNAL MEDICINE | Admitting: INTERNAL MEDICINE

## 2018-05-15 ENCOUNTER — CLINICAL SUPPORT (OUTPATIENT)
Dept: ONCOLOGY | Facility: HOSPITAL | Age: 78
End: 2018-05-15

## 2018-05-15 ENCOUNTER — HOSPITAL ENCOUNTER (OUTPATIENT)
Dept: ONCOLOGY | Facility: CLINIC | Age: 78
Setting detail: INFUSION SERIES
Discharge: HOME | End: 2018-05-15
Attending: INTERNAL MEDICINE | Admitting: INTERNAL MEDICINE

## 2018-05-15 LAB
ALBUMIN SERPL-MCNC: 3.7 G/DL (ref 3.5–4.8)
ALBUMIN/GLOB SERPL: 1.5 {RATIO} (ref 1–1.7)
ALP SERPL-CCNC: 80 IU/L (ref 32–91)
ALT SERPL-CCNC: 12 IU/L (ref 17–63)
ANION GAP SERPL CALC-SCNC: 14.7 MMOL/L (ref 10–20)
AST SERPL-CCNC: 18 IU/L (ref 15–41)
BILIRUB SERPL-MCNC: 1 MG/DL (ref 0.3–1.2)
BUN SERPL-MCNC: 15 MG/DL (ref 8–20)
BUN/CREAT SERPL: 12.5 (ref 6.2–20.3)
CALCIUM SERPL-MCNC: 8.8 MG/DL (ref 8.9–10.3)
CHLORIDE SERPL-SCNC: 101 MMOL/L (ref 101–111)
CONV CO2: 24 MMOL/L (ref 22–32)
CONV TOTAL PROTEIN: 6.2 G/DL (ref 6.1–7.9)
CREAT UR-MCNC: 1.2 MG/DL (ref 0.7–1.2)
ERYTHROCYTE [SEDIMENTATION RATE] IN BLOOD BY WESTERGREN METHOD: 22 MM/HR (ref 0–20)
GLOBULIN UR ELPH-MCNC: 2.5 G/DL (ref 2.5–3.8)
GLUCOSE SERPL-MCNC: 108 MG/DL (ref 65–99)
POTASSIUM SERPL-SCNC: 3.7 MMOL/L (ref 3.6–5.1)
SODIUM SERPL-SCNC: 136 MMOL/L (ref 136–144)

## 2018-05-15 NOTE — PROGRESS NOTES
PATIENTS ONCOLOGY RECORD LOCATED IN [facility]      Subjective     Name:  LAUREN GHOSH DARIEN     Date:  05/15/2018  Address:  [address] [place] [address]  Home: [phone]  :  1940 AGE:  78 y.o.        RECORDS OBTAINED:  Patients Oncology Record is located in [email or web address]

## 2018-05-21 ENCOUNTER — HOSPITAL ENCOUNTER (OUTPATIENT)
Dept: ONCOLOGY | Facility: CLINIC | Age: 78
Setting detail: INFUSION SERIES
Discharge: HOME | End: 2018-05-21
Attending: INTERNAL MEDICINE | Admitting: INTERNAL MEDICINE

## 2018-05-21 ENCOUNTER — CLINICAL SUPPORT (OUTPATIENT)
Dept: ONCOLOGY | Facility: HOSPITAL | Age: 78
End: 2018-05-21

## 2018-05-21 NOTE — PROGRESS NOTES
PATIENTS ONCOLOGY RECORD LOCATED IN [facility]      Subjective     Name:  LAUREN GHOSH DARIEN     Date:  2018  Address:   [address] [place] [address]  Home: [phone]  :  1940 AGE:  78 y.o.        RECORDS OBTAINED:  Patients Oncology Record is located in [facility]

## 2018-05-22 ENCOUNTER — HOSPITAL ENCOUNTER (OUTPATIENT)
Dept: OTHER | Facility: HOSPITAL | Age: 78
Setting detail: SPECIMEN
Discharge: HOME | End: 2018-05-22
Attending: OTOLARYNGOLOGY | Admitting: OTOLARYNGOLOGY

## 2018-06-08 ENCOUNTER — CLINICAL SUPPORT (OUTPATIENT)
Dept: ONCOLOGY | Facility: HOSPITAL | Age: 78
End: 2018-06-08

## 2018-06-08 ENCOUNTER — HOSPITAL ENCOUNTER (OUTPATIENT)
Dept: ONCOLOGY | Facility: CLINIC | Age: 78
Setting detail: INFUSION SERIES
Discharge: HOME | End: 2018-06-08
Attending: INTERNAL MEDICINE | Admitting: INTERNAL MEDICINE

## 2018-06-08 NOTE — PROGRESS NOTES
PATIENTS ONCOLOGY RECORD LOCATED IN [facility]      Subjective     Name:  LAUREN AUGIE LEDEZMA     Date:  2018  Address:  [address] [place] [address] DR [address]NA[address]Z [address] [place] [address]  Home: [phone]  :  1940 AGE:  78 y.o.        RECORDS OBTAINED:  Patients Oncology Record is located in [facility]

## 2018-06-19 ENCOUNTER — CLINICAL SUPPORT (OUTPATIENT)
Dept: ONCOLOGY | Facility: HOSPITAL | Age: 78
End: 2018-06-19

## 2018-06-19 ENCOUNTER — HOSPITAL ENCOUNTER (OUTPATIENT)
Dept: ONCOLOGY | Facility: CLINIC | Age: 78
Setting detail: INFUSION SERIES
Discharge: HOME | End: 2018-06-19
Attending: NURSE PRACTITIONER | Admitting: NURSE PRACTITIONER

## 2018-06-19 ENCOUNTER — HOSPITAL ENCOUNTER (OUTPATIENT)
Dept: ONCOLOGY | Facility: HOSPITAL | Age: 78
Discharge: HOME | End: 2018-06-19
Attending: NURSE PRACTITIONER | Admitting: NURSE PRACTITIONER

## 2018-06-19 LAB — ERYTHROCYTE [SEDIMENTATION RATE] IN BLOOD BY WESTERGREN METHOD: 24 MM/HR (ref 0–20)

## 2018-06-19 NOTE — PROGRESS NOTES
PATIENTS ONCOLOGY RECORD LOCATED IN [facility]      Subjective     Name:  LAUREN GHOSH DARIEN     Date:  2018  Address:  [address] [place] [address] DR HERNANDEZ [address] [place] [address]  Home: [phone]  :  1940 AGE:  78 y.o.        RECORDS OBTAINED:  Patients Oncology Record is located in [facility]

## 2018-06-26 ENCOUNTER — HOSPITAL ENCOUNTER (OUTPATIENT)
Dept: ONCOLOGY | Facility: CLINIC | Age: 78
Setting detail: INFUSION SERIES
Discharge: HOME | End: 2018-06-26
Attending: INTERNAL MEDICINE | Admitting: INTERNAL MEDICINE

## 2018-06-26 ENCOUNTER — CLINICAL SUPPORT (OUTPATIENT)
Dept: ONCOLOGY | Facility: HOSPITAL | Age: 78
End: 2018-06-26

## 2018-06-26 NOTE — PROGRESS NOTES
PATIENTS ONCOLOGY RECORD LOCATED IN [facility]      Subjective     Name:  LAUREN GHOSH DARIEN     Date:  2018  Address:  [address] [place] [address] DR HERNANDEZ [address] [place] [address]  Home: [phone]  :  1940 AGE:  78 y.o.        RECORDS OBTAINED:  Patients Oncology Record is located in [facility]

## 2018-07-02 ENCOUNTER — HOSPITAL ENCOUNTER (OUTPATIENT)
Dept: ONCOLOGY | Facility: CLINIC | Age: 78
Setting detail: INFUSION SERIES
Discharge: HOME | End: 2018-07-02
Attending: INTERNAL MEDICINE | Admitting: INTERNAL MEDICINE

## 2018-07-02 ENCOUNTER — CLINICAL SUPPORT (OUTPATIENT)
Dept: ONCOLOGY | Facility: HOSPITAL | Age: 78
End: 2018-07-02

## 2018-07-02 NOTE — PROGRESS NOTES
PATIENTS ONCOLOGY RECORD LOCATED IN [facility] [[email or web address]]      Subjective     Name:  LAUREN AUGIE LEDEZMA     Date:  2018  Address:  [address] [place] [address] DR HERNANDEZ [address] [place] [address]  Home: [phone]  :  1940 AGE:  78 y.o.        RECORDS OBTAINED:  Patients Oncology Record is located in [facility]

## 2018-07-16 ENCOUNTER — HOSPITAL ENCOUNTER (OUTPATIENT)
Dept: ONCOLOGY | Facility: CLINIC | Age: 78
Setting detail: INFUSION SERIES
Discharge: HOME | End: 2018-07-16
Attending: INTERNAL MEDICINE | Admitting: INTERNAL MEDICINE

## 2018-07-16 ENCOUNTER — CLINICAL SUPPORT (OUTPATIENT)
Dept: ONCOLOGY | Facility: HOSPITAL | Age: 78
End: 2018-07-16

## 2018-07-16 NOTE — PROGRESS NOTES
PATIENTS ONCOLOGY RECORD LOCATED IN [facility]      Subjective     Name:  LAUREN AUGIE LEDEZMA     Date:  2018  Address:  [address] [place] [address] DR HERNA[address]Z [address] [place] [address]  Home: [phone]  :  1940 AGE:  78 y.o.        RECORDS OBTAINED:  Patients Oncology Record is located in [facility]

## 2018-07-23 ENCOUNTER — HOSPITAL ENCOUNTER (OUTPATIENT)
Dept: ONCOLOGY | Facility: CLINIC | Age: 78
Setting detail: INFUSION SERIES
Discharge: HOME | End: 2018-07-23
Attending: INTERNAL MEDICINE | Admitting: INTERNAL MEDICINE

## 2018-07-23 ENCOUNTER — CLINICAL SUPPORT (OUTPATIENT)
Dept: ONCOLOGY | Facility: HOSPITAL | Age: 78
End: 2018-07-23

## 2018-07-23 NOTE — PROGRESS NOTES
PATIENTS ONCOLOGY RECORD LOCATED IN [facility] [[email or web address]]      Subjective     Name:  LAUREN AUGIE LEEDZMA     Date:  2018  Address:  [address] [place] [address] DR HERNANDEZ [address] [place] [address]  Home: [phone]  :  1940 AGE:  78 y.o.        RECORDS OBTAINED:  Patients Oncology Record is located in [facility]

## 2018-08-27 ENCOUNTER — CLINICAL SUPPORT (OUTPATIENT)
Dept: ONCOLOGY | Facility: HOSPITAL | Age: 78
End: 2018-08-27

## 2018-08-27 ENCOUNTER — HOSPITAL ENCOUNTER (OUTPATIENT)
Dept: ONCOLOGY | Facility: CLINIC | Age: 78
Setting detail: INFUSION SERIES
Discharge: HOME | End: 2018-08-27
Attending: NURSE PRACTITIONER | Admitting: NURSE PRACTITIONER

## 2018-08-27 NOTE — PROGRESS NOTES
PATIENTS ONCOLOGY RECORD LOCATED IN [facility]      Subjective     Name:  LAUREN GHOSH DARIEN     Date:  2018  Address:  [address] [place] [address] DR HERNA[address]DEZ [address] [place] [address]  Home: [phone]  :  1940 AGE:  78 y.o.        RECORDS OBTAINED:  Patients Oncology Record is located in [facility]

## 2018-10-02 ENCOUNTER — CLINICAL SUPPORT (OUTPATIENT)
Dept: ONCOLOGY | Facility: HOSPITAL | Age: 78
End: 2018-10-02

## 2018-10-02 ENCOUNTER — HOSPITAL ENCOUNTER (OUTPATIENT)
Dept: ONCOLOGY | Facility: HOSPITAL | Age: 78
Discharge: HOME | End: 2018-10-02
Attending: INTERNAL MEDICINE | Admitting: INTERNAL MEDICINE

## 2018-10-02 ENCOUNTER — HOSPITAL ENCOUNTER (OUTPATIENT)
Dept: ONCOLOGY | Facility: CLINIC | Age: 78
Setting detail: INFUSION SERIES
Discharge: HOME | End: 2018-10-02
Attending: INTERNAL MEDICINE | Admitting: INTERNAL MEDICINE

## 2018-10-02 LAB
ALBUMIN SERPL-MCNC: 3.3 G/DL (ref 3.5–4.8)
ALBUMIN/GLOB SERPL: 1.3 {RATIO} (ref 1–1.7)
ALP SERPL-CCNC: 105 IU/L (ref 32–91)
ALT SERPL-CCNC: 16 IU/L (ref 17–63)
ANION GAP SERPL CALC-SCNC: 13.1 MMOL/L (ref 10–20)
AST SERPL-CCNC: 19 IU/L (ref 15–41)
BILIRUB SERPL-MCNC: 1.1 MG/DL (ref 0.3–1.2)
BUN SERPL-MCNC: 16 MG/DL (ref 8–20)
BUN/CREAT SERPL: 13.3 (ref 6.2–20.3)
CALCIUM SERPL-MCNC: 8.4 MG/DL (ref 8.9–10.3)
CHLORIDE SERPL-SCNC: 105 MMOL/L (ref 101–111)
CONV CO2: 25 MMOL/L (ref 22–32)
CONV TOTAL PROTEIN: 5.8 G/DL (ref 6.1–7.9)
CREAT UR-MCNC: 1.2 MG/DL (ref 0.7–1.2)
ERYTHROCYTE [SEDIMENTATION RATE] IN BLOOD BY WESTERGREN METHOD: 15 MM/HR (ref 0–20)
GLOBULIN UR ELPH-MCNC: 2.5 G/DL (ref 2.5–3.8)
GLUCOSE SERPL-MCNC: 143 MG/DL (ref 65–99)
POTASSIUM SERPL-SCNC: 4.1 MMOL/L (ref 3.6–5.1)
SODIUM SERPL-SCNC: 139 MMOL/L (ref 136–144)

## 2018-10-02 NOTE — PROGRESS NOTES
PATIENTS ONCOLOGY RECORD LOCATED IN [facility]      Subjective     Name:  LAUREN GHOSH DARIEN     Date:  10/02/2018  Address:  [address] [place] [address] DR HERNA[address]DEZ [address] [place] [address]  Home: [phone]  :  1940 AGE:  78 y.o.        RECORDS OBTAINED:  Patients Oncology Record is located in [facility]

## 2018-10-31 ENCOUNTER — HOSPITAL ENCOUNTER (OUTPATIENT)
Dept: PREADMISSION TESTING | Facility: HOSPITAL | Age: 78
Discharge: HOME | End: 2018-10-31
Attending: PLASTIC SURGERY | Admitting: PLASTIC SURGERY

## 2018-10-31 LAB
ANION GAP SERPL CALC-SCNC: 12.4 MMOL/L (ref 10–20)
BASOPHILS # BLD AUTO: 0 10*3/UL (ref 0–0.2)
BASOPHILS NFR BLD AUTO: 1 % (ref 0–2)
BUN SERPL-MCNC: 13 MG/DL (ref 8–20)
BUN/CREAT SERPL: 10 (ref 6.2–20.3)
CALCIUM SERPL-MCNC: 9 MG/DL (ref 8.9–10.3)
CHLORIDE SERPL-SCNC: 100 MMOL/L (ref 101–111)
CONV CO2: 27 MMOL/L (ref 22–32)
CREAT UR-MCNC: 1.3 MG/DL (ref 0.7–1.2)
DIFFERENTIAL METHOD BLD: (no result)
EOSINOPHIL # BLD AUTO: 0.1 10*3/UL (ref 0–0.3)
EOSINOPHIL # BLD AUTO: 1 % (ref 0–3)
ERYTHROCYTE [DISTWIDTH] IN BLOOD BY AUTOMATED COUNT: 14.2 % (ref 11.5–14.5)
GLUCOSE SERPL-MCNC: 94 MG/DL (ref 65–99)
HCT VFR BLD AUTO: 42.8 % (ref 40–54)
HGB BLD-MCNC: 14.5 G/DL (ref 14–18)
LYMPHOCYTES # BLD AUTO: 1.5 10*3/UL (ref 0.8–4.8)
LYMPHOCYTES NFR BLD AUTO: 26 % (ref 18–42)
MCH RBC QN AUTO: 34 PG (ref 26–32)
MCHC RBC AUTO-ENTMCNC: 33.8 G/DL (ref 32–36)
MCV RBC AUTO: 100.5 FL (ref 80–94)
MONOCYTES # BLD AUTO: 0.7 10*3/UL (ref 0.1–1.3)
MONOCYTES NFR BLD AUTO: 12 % (ref 2–11)
NEUTROPHILS # BLD AUTO: 3.6 10*3/UL (ref 2.3–8.6)
NEUTROPHILS NFR BLD AUTO: 60 % (ref 50–75)
NRBC BLD AUTO-RTO: 0 /100{WBCS}
NRBC/RBC NFR BLD MANUAL: 0 10*3/UL
PLATELET # BLD AUTO: 119 10*3/UL (ref 150–450)
PMV BLD AUTO: 8.8 FL (ref 7.4–10.4)
POTASSIUM SERPL-SCNC: 4.4 MMOL/L (ref 3.6–5.1)
RBC # BLD AUTO: 4.25 10*6/UL (ref 4.6–6)
SODIUM SERPL-SCNC: 135 MMOL/L (ref 136–144)
WBC # BLD AUTO: 6 10*3/UL (ref 4.5–11.5)

## 2018-11-01 ENCOUNTER — HOSPITAL ENCOUNTER (OUTPATIENT)
Dept: ONCOLOGY | Facility: CLINIC | Age: 78
Setting detail: INFUSION SERIES
Discharge: HOME | End: 2018-11-01
Attending: INTERNAL MEDICINE | Admitting: INTERNAL MEDICINE

## 2018-11-01 ENCOUNTER — CLINICAL SUPPORT (OUTPATIENT)
Dept: ONCOLOGY | Facility: HOSPITAL | Age: 78
End: 2018-11-01

## 2018-11-01 NOTE — PROGRESS NOTES
PATIENTS ONCOLOGY RECORD LOCATED IN [facility]      Subjective     Name:  LAUREN GHOSH DARIEN     Date:  2018  Address:  [address] [place] [address] DR HERNA[address]DEZ [address] [place] [address]  Home: [phone]  :  1940 AGE:  78 y.o.        RECORDS OBTAINED:  Patients Oncology Record is located in [facility]

## 2018-11-09 ENCOUNTER — HOSPITAL ENCOUNTER (OUTPATIENT)
Dept: PREOP | Facility: HOSPITAL | Age: 78
Setting detail: HOSPITAL OUTPATIENT SURGERY
Discharge: HOME | End: 2018-11-09
Attending: PLASTIC SURGERY | Admitting: PLASTIC SURGERY

## 2018-11-09 LAB
APTT BLD: ABNORMAL SEC (ref 24–31)
INR PPP: 1
PROTHROMBIN TIME: 10.4 SEC (ref 9.6–11.7)

## 2018-12-06 ENCOUNTER — CLINICAL SUPPORT (OUTPATIENT)
Dept: ONCOLOGY | Facility: HOSPITAL | Age: 78
End: 2018-12-06

## 2018-12-06 ENCOUNTER — HOSPITAL ENCOUNTER (OUTPATIENT)
Dept: ONCOLOGY | Facility: CLINIC | Age: 78
Setting detail: INFUSION SERIES
Discharge: HOME | End: 2018-12-06
Attending: INTERNAL MEDICINE | Admitting: INTERNAL MEDICINE

## 2018-12-06 NOTE — PROGRESS NOTES
PATIENTS ONCOLOGY RECORD LOCATED IN [facility]      Subjective     Name:  LAUREN GHOSH DARIEN     Date:  2018  Address:  [facility] COUNTRY [facility] DR HERNANDEZ [address] [place] [address] [place] [facility]  Home: [unfilled]  :  1940 AGE:  78 y.o.        RECORDS OBTAINED:  Patients Oncology Record is located in [facility]

## 2019-01-10 ENCOUNTER — CLINICAL SUPPORT (OUTPATIENT)
Dept: ONCOLOGY | Facility: HOSPITAL | Age: 79
End: 2019-01-10

## 2019-01-10 ENCOUNTER — HOSPITAL ENCOUNTER (OUTPATIENT)
Dept: ONCOLOGY | Facility: CLINIC | Age: 79
Setting detail: INFUSION SERIES
Discharge: HOME | End: 2019-01-10
Attending: NURSE PRACTITIONER | Admitting: NURSE PRACTITIONER

## 2019-01-10 NOTE — PROGRESS NOTES
PATIENTS ONCOLOGY RECORD LOCATED IN [facility]      Subjective     Name:  LAUREN GHOSH DARIEN     Date:  01/10/2019  Address:  [facility] COUNTRY [facility] DR HERNANDEZ [address] [place] [address] [place] [facility]  Home: [unfilled]  :  1940 AGE:  78 y.o.        RECORDS OBTAINED:  Patients Oncology Record is located in [facility]

## 2019-02-07 ENCOUNTER — HOSPITAL ENCOUNTER (OUTPATIENT)
Dept: ONCOLOGY | Facility: HOSPITAL | Age: 79
Discharge: HOME | End: 2019-02-07
Attending: INTERNAL MEDICINE | Admitting: INTERNAL MEDICINE

## 2019-02-07 LAB — CREAT BLDA-MCNC: 1.1 MG/DL (ref 0.6–1.3)

## 2019-02-14 ENCOUNTER — CLINICAL SUPPORT (OUTPATIENT)
Dept: ONCOLOGY | Facility: HOSPITAL | Age: 79
End: 2019-02-14

## 2019-02-14 ENCOUNTER — HOSPITAL ENCOUNTER (OUTPATIENT)
Dept: ONCOLOGY | Facility: CLINIC | Age: 79
Setting detail: INFUSION SERIES
Discharge: HOME | End: 2019-02-14
Attending: INTERNAL MEDICINE | Admitting: INTERNAL MEDICINE

## 2019-02-14 NOTE — PROGRESS NOTES
PATIENTS ONCOLOGY RECORD LOCATED IN [facility]      Subjective     Name:  LAUREN GHOSH DARIEN     Date:  2019  Address:  [facility] COUNTRY [facility] DR HERNANDEZ [address] [place] [address] [place] [facility]  Home: [unfilled]  :  1940 AGE:  79 y.o.        RECORDS OBTAINED:  Patients Oncology Record is located in [facility]

## 2019-02-22 ENCOUNTER — CLINICAL SUPPORT (OUTPATIENT)
Dept: ONCOLOGY | Facility: HOSPITAL | Age: 79
End: 2019-02-22

## 2019-02-22 ENCOUNTER — HOSPITAL ENCOUNTER (OUTPATIENT)
Dept: ONCOLOGY | Facility: CLINIC | Age: 79
Setting detail: INFUSION SERIES
Discharge: HOME | End: 2019-02-22
Attending: INTERNAL MEDICINE | Admitting: INTERNAL MEDICINE

## 2019-02-22 NOTE — PROGRESS NOTES
PATIENTS ONCOLOGY RECORD LOCATED IN [facility]      Subjective     Name:  LAUREN GHOSH DARIEN     Date:  2019  Address:  [facility] COUNTRY [facility] DR HERNANDEZ [address] [place] [address] [place] [facility]  Home: [unfilled]  :  1940 AGE:  79 y.o.        RECORDS OBTAINED:  Patients Oncology Record is located in [facility]

## 2019-03-14 ENCOUNTER — HOSPITAL ENCOUNTER (OUTPATIENT)
Dept: ONCOLOGY | Facility: CLINIC | Age: 79
Setting detail: INFUSION SERIES
Discharge: HOME | End: 2019-03-14
Attending: INTERNAL MEDICINE | Admitting: INTERNAL MEDICINE

## 2019-03-14 ENCOUNTER — CLINICAL SUPPORT (OUTPATIENT)
Dept: ONCOLOGY | Facility: HOSPITAL | Age: 79
End: 2019-03-14

## 2019-03-14 NOTE — PROGRESS NOTES
PATIENTS ONCOLOGY RECORD LOCATED IN [facility]      Subjective     Name:  LAUREN GHOSH DARIEN     Date:  2019  Address:  [facility] COUNTRY [facility] DR HERNANDEZ [address] [place] [address] [place] [facility]  Home: [unfilled]  :  1940 AGE:  79 y.o.        RECORDS OBTAINED:  Patients Oncology Record is located in [facility]

## 2019-04-11 ENCOUNTER — CLINICAL SUPPORT (OUTPATIENT)
Dept: ONCOLOGY | Facility: HOSPITAL | Age: 79
End: 2019-04-11

## 2019-04-11 ENCOUNTER — HOSPITAL ENCOUNTER (OUTPATIENT)
Dept: ONCOLOGY | Facility: CLINIC | Age: 79
Discharge: HOME | End: 2019-04-11
Attending: INTERNAL MEDICINE | Admitting: INTERNAL MEDICINE

## 2019-04-11 ENCOUNTER — HOSPITAL ENCOUNTER (OUTPATIENT)
Dept: ONCOLOGY | Facility: CLINIC | Age: 79
Setting detail: INFUSION SERIES
Discharge: HOME | End: 2019-04-11
Attending: INTERNAL MEDICINE | Admitting: INTERNAL MEDICINE

## 2019-04-11 LAB
ALBUMIN SERPL-MCNC: 3.6 G/DL (ref 3.5–4.8)
ALBUMIN/GLOB SERPL: 1.4 {RATIO} (ref 1–1.7)
ALP SERPL-CCNC: 82 IU/L (ref 32–91)
ALT SERPL-CCNC: 14 IU/L (ref 17–63)
ANION GAP SERPL CALC-SCNC: 14.2 MMOL/L (ref 10–20)
AST SERPL-CCNC: 20 IU/L (ref 15–41)
BILIRUB SERPL-MCNC: 1.6 MG/DL (ref 0.3–1.2)
BUN SERPL-MCNC: 13 MG/DL (ref 8–20)
BUN/CREAT SERPL: 10 (ref 6.2–20.3)
CALCIUM SERPL-MCNC: 8.5 MG/DL (ref 8.9–10.3)
CHLORIDE SERPL-SCNC: 105 MMOL/L (ref 101–111)
CONV CO2: 24 MMOL/L (ref 22–32)
CONV TOTAL PROTEIN: 6.1 G/DL (ref 6.1–7.9)
CREAT UR-MCNC: 1.3 MG/DL (ref 0.7–1.2)
ERYTHROCYTE [SEDIMENTATION RATE] IN BLOOD BY WESTERGREN METHOD: 20 MM/HR (ref 0–20)
GLOBULIN UR ELPH-MCNC: 2.5 G/DL (ref 2.5–3.8)
GLUCOSE SERPL-MCNC: 128 MG/DL (ref 65–99)
POTASSIUM SERPL-SCNC: 4.2 MMOL/L (ref 3.6–5.1)
SODIUM SERPL-SCNC: 139 MMOL/L (ref 136–144)

## 2019-04-11 NOTE — PROGRESS NOTES
PATIENTS ONCOLOGY RECORD LOCATED IN [facility]      Subjective     Name:  LAUREN GHOSH DARIEN     Date:  2019  Address:  [facility] COUNTRY [facility] DR HERNANDEZ [address] [place] [address] [place] [facility]  Home: [unfilled]  :  1940 AGE:  79 y.o.        RECORDS OBTAINED:  Patients Oncology Record is located in [facility]

## 2019-05-20 ENCOUNTER — HOSPITAL ENCOUNTER (OUTPATIENT)
Dept: ONCOLOGY | Facility: CLINIC | Age: 79
Setting detail: INFUSION SERIES
Discharge: HOME | End: 2019-05-20
Attending: INTERNAL MEDICINE | Admitting: INTERNAL MEDICINE

## 2019-05-20 ENCOUNTER — CLINICAL SUPPORT (OUTPATIENT)
Dept: ONCOLOGY | Facility: HOSPITAL | Age: 79
End: 2019-05-20

## 2019-05-20 NOTE — PROGRESS NOTES
PATIENTS ONCOLOGY RECORD LOCATED IN [facility]      Subjective     Name:  LAUREN GHOSH DARIEN     Date:  2019  Address:  [facility] COUNTRY [facility] DR HERNANDEZ [address] [place] [address] [place] [facility]  Home: [unfilled]  :  1940 AGE:  79 y.o.        RECORDS OBTAINED:  Patients Oncology Record is located in [facility]

## 2019-05-21 VITALS — BODY MASS INDEX: 39.04 KG/M2 | HEIGHT: 73 IN | WEIGHT: 294.6 LBS

## 2019-05-21 DIAGNOSIS — I27.82 OTHER CHRONIC PULMONARY EMBOLISM WITHOUT ACUTE COR PULMONALE (HCC): Primary | ICD-10-CM

## 2019-05-21 PROBLEM — Z45.2 ENCOUNTER FOR CARE RELATED TO VASCULAR ACCESS PORT: Status: ACTIVE | Noted: 2019-05-21

## 2019-05-21 PROBLEM — I82.5Y9 CHRONIC DEEP VEIN THROMBOSIS (DVT) OF PROXIMAL VEIN OF LOWER EXTREMITY (HCC): Status: ACTIVE | Noted: 2019-05-21

## 2019-05-21 RX ORDER — SODIUM CHLORIDE 0.9 % (FLUSH) 0.9 %
10 SYRINGE (ML) INJECTION AS NEEDED
Status: CANCELLED | OUTPATIENT
Start: 2019-05-21

## 2019-06-17 ENCOUNTER — APPOINTMENT (OUTPATIENT)
Dept: LAB | Facility: HOSPITAL | Age: 79
End: 2019-06-17

## 2019-06-17 ENCOUNTER — HOSPITAL ENCOUNTER (OUTPATIENT)
Dept: ONCOLOGY | Facility: HOSPITAL | Age: 79
Setting detail: INFUSION SERIES
Discharge: HOME | End: 2019-06-17

## 2019-06-17 DIAGNOSIS — C82.90 FOLLICULAR NON-HODGKIN'S LYMPHOMA (HCC): ICD-10-CM

## 2019-06-17 DIAGNOSIS — Z45.2 ENCOUNTER FOR CARE RELATED TO VASCULAR ACCESS PORT: Primary | ICD-10-CM

## 2019-06-17 DIAGNOSIS — I82.5Y9 CHRONIC DEEP VEIN THROMBOSIS (DVT) OF PROXIMAL VEIN OF LOWER EXTREMITY, UNSPECIFIED LATERALITY (HCC): Primary | ICD-10-CM

## 2019-06-17 LAB
INR PPP: 2.1
PROTHROMBIN TIME: 26.1 SECONDS (ref 11–15)

## 2019-06-17 PROCEDURE — 96523 IRRIG DRUG DELIVERY DEVICE: CPT

## 2019-06-17 PROCEDURE — 85610 PROTHROMBIN TIME: CPT

## 2019-06-17 RX ORDER — SODIUM CHLORIDE 0.9 % (FLUSH) 0.9 %
10 SYRINGE (ML) INJECTION AS NEEDED
Status: CANCELLED | OUTPATIENT
Start: 2019-06-17

## 2019-06-17 RX ORDER — SODIUM CHLORIDE 0.9 % (FLUSH) 0.9 %
10 SYRINGE (ML) INJECTION AS NEEDED
Status: DISCONTINUED | OUTPATIENT
Start: 2019-06-17 | End: 2019-06-18 | Stop reason: HOSPADM

## 2019-06-17 RX ADMIN — Medication 30 ML: at 11:43

## 2019-06-19 RX ORDER — PANTOPRAZOLE SODIUM 40 MG/1
40 TABLET, DELAYED RELEASE ORAL DAILY
Qty: 30 TABLET | Refills: 3 | Status: SHIPPED | OUTPATIENT
Start: 2019-06-19 | End: 2019-10-25 | Stop reason: SDUPTHER

## 2019-06-19 RX ORDER — WARFARIN SODIUM 3 MG/1
3 TABLET ORAL DAILY
Refills: 3 | COMMUNITY
Start: 2019-05-21 | End: 2019-06-19 | Stop reason: SDUPTHER

## 2019-06-19 RX ORDER — WARFARIN SODIUM 1 MG/1
1 TABLET ORAL DAILY
Qty: 30 TABLET | Refills: 3 | Status: SHIPPED | OUTPATIENT
Start: 2019-06-19 | End: 2019-08-14 | Stop reason: SDUPTHER

## 2019-06-19 RX ORDER — WARFARIN SODIUM 3 MG/1
3 TABLET ORAL DAILY
Qty: 30 TABLET | Refills: 3 | Status: SHIPPED | OUTPATIENT
Start: 2019-06-19 | End: 2019-08-14 | Stop reason: SDUPTHER

## 2019-06-19 RX ORDER — WARFARIN SODIUM 1 MG/1
1 TABLET ORAL DAILY
Refills: 3 | COMMUNITY
Start: 2019-04-19 | End: 2019-06-19 | Stop reason: SDUPTHER

## 2019-06-19 RX ORDER — PANTOPRAZOLE SODIUM 40 MG/1
40 TABLET, DELAYED RELEASE ORAL DAILY
Refills: 3 | COMMUNITY
Start: 2019-05-16 | End: 2019-06-19 | Stop reason: SDUPTHER

## 2019-06-19 NOTE — TELEPHONE ENCOUNTER
Patient called requesting refill on Warfarin 3 mg, Warfarin 1 mg and Pantoprazole 40  Mg.  [place] [facility]

## 2019-07-11 ENCOUNTER — LAB (OUTPATIENT)
Dept: LAB | Facility: HOSPITAL | Age: 79
End: 2019-07-11

## 2019-07-11 ENCOUNTER — HOSPITAL ENCOUNTER (OUTPATIENT)
Dept: ONCOLOGY | Facility: HOSPITAL | Age: 79
Setting detail: INFUSION SERIES
Discharge: HOME | End: 2019-07-11

## 2019-07-11 ENCOUNTER — TELEPHONE (OUTPATIENT)
Dept: ONCOLOGY | Facility: HOSPITAL | Age: 79
End: 2019-07-11

## 2019-07-11 VITALS
DIASTOLIC BLOOD PRESSURE: 81 MMHG | HEART RATE: 91 BPM | SYSTOLIC BLOOD PRESSURE: 131 MMHG | TEMPERATURE: 97.6 F | HEIGHT: 73 IN | RESPIRATION RATE: 18 BRPM | BODY MASS INDEX: 39.76 KG/M2 | WEIGHT: 300 LBS

## 2019-07-11 DIAGNOSIS — Z45.2 ENCOUNTER FOR CARE RELATED TO VASCULAR ACCESS PORT: Primary | ICD-10-CM

## 2019-07-11 DIAGNOSIS — C82.90 FOLLICULAR NON-HODGKIN'S LYMPHOMA (HCC): ICD-10-CM

## 2019-07-11 DIAGNOSIS — I82.5Y9 CHRONIC DEEP VEIN THROMBOSIS (DVT) OF PROXIMAL VEIN OF LOWER EXTREMITY, UNSPECIFIED LATERALITY (HCC): Primary | ICD-10-CM

## 2019-07-11 LAB
INR PPP: 2.2
PROTHROMBIN TIME: 26.5 SECONDS (ref 11–15)

## 2019-07-11 PROCEDURE — 96523 IRRIG DRUG DELIVERY DEVICE: CPT

## 2019-07-11 PROCEDURE — 85610 PROTHROMBIN TIME: CPT

## 2019-07-11 RX ORDER — SODIUM CHLORIDE 0.9 % (FLUSH) 0.9 %
10 SYRINGE (ML) INJECTION AS NEEDED
Status: CANCELLED | OUTPATIENT
Start: 2019-07-11

## 2019-07-11 RX ORDER — SODIUM CHLORIDE 0.9 % (FLUSH) 0.9 %
10 SYRINGE (ML) INJECTION AS NEEDED
Status: DISCONTINUED | OUTPATIENT
Start: 2019-07-11 | End: 2019-07-12 | Stop reason: HOSPADM

## 2019-07-11 RX ADMIN — Medication 30 ML: at 09:30

## 2019-07-11 RX ADMIN — HEPARIN 500 UNITS: 100 SYRINGE at 09:30

## 2019-07-11 NOTE — ADDENDUM NOTE
Encounter addended by: Sujatha Castaneda RN on: 7/11/2019 9:43 AM   Actions taken: Diagnosis association updated, Order list changed, Actions taken from a BestPractice Advisory, MAR administration accepted

## 2019-07-11 NOTE — ADDENDUM NOTE
Encounter addended by: Sujatha Castaneda RN on: 7/11/2019 9:45 AM   Actions taken: Actions taken from a BestPractice Advisory, MAR administration accepted

## 2019-07-16 PROBLEM — I48.0 PAROXYSMAL ATRIAL FIBRILLATION (HCC): Status: ACTIVE | Noted: 2018-08-13

## 2019-07-16 PROBLEM — E78.5 HYPERLIPIDEMIA: Status: ACTIVE | Noted: 2019-07-16

## 2019-07-16 PROBLEM — I10 HYPERTENSION: Status: ACTIVE | Noted: 2019-07-16

## 2019-07-16 RX ORDER — RANITIDINE 150 MG/1
TABLET ORAL
COMMUNITY
Start: 2015-02-24

## 2019-07-16 RX ORDER — CYANOCOBALAMIN 1000 UG/ML
INJECTION, SOLUTION INTRAMUSCULAR; SUBCUTANEOUS
COMMUNITY
Start: 2018-08-13 | End: 2019-08-14 | Stop reason: SDUPTHER

## 2019-07-16 RX ORDER — ASPIRIN 325 MG
TABLET ORAL
COMMUNITY
Start: 2015-02-24

## 2019-07-16 RX ORDER — METOPROLOL TARTRATE 50 MG/1
TABLET, FILM COATED ORAL EVERY 12 HOURS
COMMUNITY
Start: 2018-08-13

## 2019-07-16 RX ORDER — ESCITALOPRAM OXALATE 20 MG/1
TABLET ORAL EVERY 24 HOURS
COMMUNITY
Start: 2015-02-24

## 2019-07-16 RX ORDER — HYDROCODONE BITARTRATE AND ACETAMINOPHEN 5; 325 MG/1; MG/1
1 TABLET ORAL EVERY 12 HOURS PRN
Refills: 0 | COMMUNITY
Start: 2019-06-26

## 2019-07-16 RX ORDER — ZOLPIDEM TARTRATE 10 MG/1
TABLET ORAL
COMMUNITY
Start: 2015-02-24

## 2019-07-16 RX ORDER — NITROGLYCERIN 0.4 MG/1
TABLET SUBLINGUAL
COMMUNITY
Start: 2015-02-25

## 2019-07-16 RX ORDER — ATORVASTATIN CALCIUM 80 MG/1
TABLET, FILM COATED ORAL EVERY 24 HOURS
COMMUNITY
Start: 2018-08-13

## 2019-07-16 RX ORDER — DOXEPIN HYDROCHLORIDE 100 MG/1
CAPSULE ORAL
COMMUNITY
Start: 2012-10-05

## 2019-07-16 RX ORDER — DIAZEPAM 5 MG/1
TABLET ORAL
COMMUNITY
Start: 2015-02-25

## 2019-07-16 RX ORDER — IPRATROPIUM BROMIDE AND ALBUTEROL SULFATE 2.5; .5 MG/3ML; MG/3ML
SOLUTION RESPIRATORY (INHALATION)
COMMUNITY
Start: 2015-02-25

## 2019-07-16 RX ORDER — OMEGA-3S/DHA/EPA/FISH OIL/D3 300MG-1000
CAPSULE ORAL
COMMUNITY
Start: 2015-02-25

## 2019-07-16 RX ORDER — MECLIZINE HYDROCHLORIDE 25 MG/1
TABLET ORAL
COMMUNITY
Start: 2015-02-25

## 2019-07-16 RX ORDER — FOLIC ACID 1 MG/1
TABLET ORAL
COMMUNITY
Start: 2015-02-24

## 2019-07-16 RX ORDER — LANOLIN ALCOHOL/MO/W.PET/CERES
1 CREAM (GRAM) TOPICAL DAILY
Refills: 7 | COMMUNITY
Start: 2019-06-17 | End: 2019-07-18 | Stop reason: SDUPTHER

## 2019-07-16 RX ORDER — ISOSORBIDE MONONITRATE 60 MG/1
TABLET, EXTENDED RELEASE ORAL EVERY 24 HOURS
COMMUNITY
Start: 2015-02-25

## 2019-07-16 RX ORDER — POTASSIUM CHLORIDE 20 MEQ/1
20 TABLET, EXTENDED RELEASE ORAL DAILY
Refills: 3 | COMMUNITY
Start: 2019-06-17 | End: 2019-08-13 | Stop reason: SDUPTHER

## 2019-07-17 ENCOUNTER — OFFICE VISIT (OUTPATIENT)
Dept: CARDIOLOGY | Facility: CLINIC | Age: 79
End: 2019-07-17

## 2019-07-17 VITALS
HEIGHT: 72 IN | OXYGEN SATURATION: 89 % | DIASTOLIC BLOOD PRESSURE: 87 MMHG | HEART RATE: 70 BPM | SYSTOLIC BLOOD PRESSURE: 128 MMHG | BODY MASS INDEX: 40.9 KG/M2 | WEIGHT: 302 LBS

## 2019-07-17 DIAGNOSIS — I48.20 ATRIAL FIBRILLATION, CHRONIC (HCC): Primary | ICD-10-CM

## 2019-07-17 PROCEDURE — 93000 ELECTROCARDIOGRAM COMPLETE: CPT | Performed by: INTERNAL MEDICINE

## 2019-07-17 PROCEDURE — 99214 OFFICE O/P EST MOD 30 MIN: CPT | Performed by: INTERNAL MEDICINE

## 2019-07-17 NOTE — PROGRESS NOTES
"    Subjective:     Encounter Date:07/17/2019      Patient ID: Caleb Tapia Sr. is a 79 y.o. male.    Chief Complaint: SOB with activity  1 year follow up .   CAD with CABG and coronary stents. Paroxysmal atrial fibrillation. Hx  pulmonary embolus CT chest 2014. Obesity. HTN. DM . Dyslipidemia.  Lymphoma .  SOB with mild activity.    No squeezing chest discomfort such as he had prior to his CABG or coronary stent.  No orthopnea or PND.     Swelling of ankles. No syncope or near syncope.  21 pound weight gain in the past year.    Patient said he had CT of head 2018 and was told by  that  CT showed old stroke.  No transient focal neurologic loss.  Rhythmic jerking of right foot and at times some numbness of right foot     PMH:  Obesity        DM   4/11/2019        Hyperlipidemia        Hypertension        Cig till about 2000 and COPD         dilatation ascending aorta CT chest 2010                Hypothyroidism                Lymphoma treated 2012 and 2013. Probably including radiation supraclavicular area.        WBC 3800 Hgb 13.3 .7 Plts  [id number] 01/25/2018 April 2014 in [facility] for CP and SOB. CT chest “single small filling defect in segmental or subsegmental branch of right lower lobe pulmonary artery.” Venous duplex LE “essentially unremarkable\". Full anticoag warfarin since.                  Chronic left heart failure normal LVEF         PAF                CABG 1996  LIMA to LAD, SVG to diagonal branch  LAD, SVG to marginal branch  Cx, and SVG PDA off  RCA.                 NSTEMI  2009  3.0/24 Taxus stent to the distal segment of SVG to diagonal branch.                4.5/20 BMS to marginal branch of Cx dilated to 5.0 to 5.1 April 2013.                 Heart cath  01/02/2018  LAD  50-70%  mid. Mild #1 diag 50-70 prox. Mod #2 diag 70% ostial/prox. Total mild/mod lat lore Cx. Total prox RCA.   LIMA atretic. SVG to #2 diag  total. SVG lat lore total. SVG to  PDA off RCA " 50%.         Heart cath 03/25/2015                  Echo 12/31/2017 LV OK EF 55-60%        Echo  3/31/2016  LV OK EF 60- 65%.  BA E. AR 4.0 cm                TMT 2/27/15 Ex 3 min at 1.5/10. Prominent dyspnea. EKG low QRS voltage, inf Q waves unsure significance, ST and T wave abn. No further ST seg change or rhythm disturbance with or following ex.                  Holter 01/03/2018 Sinus rhythm . Longest pause 1.3 s.  no ventricular ectopy.  Benign PAC.  2 brief SVT 3 and 9 complexes in duration at 132/min.                CT abdomen pelvis 02/24/2016  retroperitoneal adenopathy concerning for recurrent lymphoma.        CT chest and abdomen/pelvis and soft tissue neck 10/4/2016 no findings of recurrence lymphoma.                 O2 sat overnight 02/16/2018.                Creatinine 1.3 2/2/2017, 1.3  01/25/2018, 1.3 K 4.2 4/11/2019         WBC 3800 Hgb 13.3 .7 Plts  [id number] 01/25/2018                  [facility] Jan 2018  angina pectoris.        .         ECG 12 Lead  Date/Time: 7/17/2019 1:36 PM  Performed by: Víctor Machado MD  Authorized by: Víctor aMchado MD   Comparison: compared with previous ECG from 10/31/2018  Comments:   Atrial fibrillation  Prominent R waves right precordial leads  Anterolateral and possible posterior infarct  ST and T wave abnormality  Abnormal EKG  Compared to EKG 10/31/2018 present EKG had anterolateral infarct and more prominent ST and T wave abnormality               Objective:     Physical Exam   Constitutional:   Weight 302 pounds with 21 pound gain in the past year and 7 pound loss in the past 3.5 years  /80 RA= LA lying and standing  HR 70 regular rhythm  O2 sat 89% room air   Neck: No thyromegaly present.   Cardiovascular:   No carotid bruits  No JVD  No palpable precordial impulses  Decreased heart sounds  No murmur, gallop, rub   Pulmonary/Chest:   Bilateral decreased breath sounds  No wheezing, rhonchi, rales   Abdominal:   Obese  Liver and spleen not  palpable   Musculoskeletal:   Bilateral mild lower extremity edema  Right and left dorsalis pedis pulses +2 and posterior tibial pulses not palpable   Neurological:   Rhythmic slow jerking right foot       Lab Review:     4/11/2019 creatinine 1.3 K 4.2  ALT 14  7/11/2019 INR 2.2  Assessment:     No symptoms suggestive of angina pectoris or SOB with usual activity.    BP satisfactory.  Mild ankle edema    Obese weight.  PAF with rate control with metoprolol and anticoagulation with warfarin.  CABG and  SVG stent and on ASA and statin.  [facility]king of the right foot.     Plan:       Encouraged continued weight reduction and 3 g sodium restriction with handout provided and regular mild exercise with goal 30 minutes 5 days a week.  Continue present medication.     Return appointment 9 mo with EKG.  [facility] and

## 2019-07-18 RX ORDER — LANOLIN ALCOHOL/MO/W.PET/CERES
CREAM (GRAM) TOPICAL
Qty: 30 TABLET | Refills: 7 | Status: SHIPPED | OUTPATIENT
Start: 2019-07-18

## 2019-08-06 DIAGNOSIS — I82.5Y9 CHRONIC DEEP VEIN THROMBOSIS (DVT) OF PROXIMAL VEIN OF LOWER EXTREMITY, UNSPECIFIED LATERALITY (HCC): Primary | ICD-10-CM

## 2019-08-07 ENCOUNTER — APPOINTMENT (OUTPATIENT)
Dept: ONCOLOGY | Facility: CLINIC | Age: 79
End: 2019-08-07

## 2019-08-13 PROBLEM — E53.8 VITAMIN B12 DEFICIENCY: Status: ACTIVE | Noted: 2019-08-13

## 2019-08-13 PROBLEM — D61.818 PANCYTOPENIA (HCC): Status: ACTIVE | Noted: 2019-08-13

## 2019-08-13 NOTE — PROGRESS NOTES
Hematology/Oncology Outpatient Follow Up    PATIENT NAME:Caleb Tapia Sr.  :1940  MRN: [id number]  PRIMARY CARE PHYSICIAN: Wanda Whalen  REFERRING PHYSICIAN: Wanda Whalen MD    Chief Complaint   Patient presents with   • Follow-up     Follicular non-Hodgkin's lymphoma Stage LOLA with recurrence (FLIPI-3), Recurrent DVT’s and PE’s, Vitamin B12 deficiency,  Pancytopenia         HISTORY OF PRESENT ILLNESS:   1. Follicular Non-Hodgkin's lymphoma stage IV diagnosed 2011.  • The patient was hospitalized at [facility] from 10/18/11 to 10/21/11 with a non-ST elevation myocardial infarction and pancytopenia. Hematology consultation was obtained and Dr. Raheem Harrison saw the patient on 10/19/311. CBC revealed WBC of 3,400/mm3, hemoglobin of 13.8 gm/dL and platelet count 78.000/mm3. He had normal left ventricular systolic function with ejection fraction of 55% on an echocardiogram. CT scan of the chest, abdomen and pelvis revealed subcarinal node decreased in size from prior CT scan of 12/15/10. However, the patient had developed posterior mediastinal and retrocrural lymphadenopathy. There were retroperitoneal and mesenteric adenopathy. Increased density in the fat around the mesenteric adenopathy, slightly enlarged right common iliac node and incidental finding of bilateral renal cysts and small fat containing left inguinal hernia. It was recommended for the patient to undergo a cardiac catheterization once his renal function returned to normal, which was running at a serum creatinine of 1.2 to 1.4 mg/dL and he was seen in follow up by me for the first time at the [facility] on 11.  • Patient was evaluated for neutropenia by Dr. Chi Woods in 2008 at the [facility].  He had a normal LAP score and B12, but was found to have a hepatosplenomegaly and developed thrombocytopenia. He underwent bone marrow biopsy of the left iliac crest, which was  insufficient for diagnosis as the specimen consisted mostly of clotted blood containing a few foci of bone marrow cells diluted by sinusoidal blood, but no medullary marrow containing bone.  The bone marrow was read by Dr. Shaun Dozier as normocellular bone marrow with no cytological evidence of myelodysplasia. There were lymphoid aggregates greater than 30% of bone marrow cells, suspicious for lymphoproliferative disorder. Flow cytometry revealed involvement of the bone marrow by a monoclonal B cell population described as Dim positivity for CD10 suggests a lymphoma of follicle center cell origin. Cytogenetics normal. CT scan of the abdomen and pelvis had revealed cysts or hemangiomas in the liver, stable adenopathy in mesentery with mesenteric stranding and paraaortic retroperitoneal adenopathy. Repeat CT scan in March 2011 had revealed borderline splenomegaly.  • 12/19/11 - Sed rate 30 (H). Hepatitis panel negative, Beta 2 Microglobulin 4.56 (H),  (N).  • 12/23/11 - Bone marrow aspiration and biopsy with lymphocytosis and lymphoid infiltration consistent with involvement by follicular lymphoma. Peripheral thrombocytopenia with adequate numbers of bone marrow megakaryocytes. Absent iron stores. Flow cytometry revealed involvement by a B cell non-Hodgkin's lymphoma of follicle center cell. Cytogenetics normal.   • 12/28/11 - PET scan revealed no change in the lymphadenopathy throughout the chest, abdomen, and pelvis consistent with known non-Hodgkin's lymphoma.  Interval development of a small amount of free fluid within the tiny umbilical hernia likely of no significance.  • 1/9/12 - Order written for patient to be started on chemotherapy consisting of Rituxan 750 mg IV day 1, Cytoxan 1500 mg IV day 1, Vincristine 2 mg IV day 1, and Prednisone 100 mg PO daily for five days to cycle every three weeks.  • 1/18/12 - Patient given cycle 1 R-CVP.  • 2/8/12 - Patient given cycle 2 R-CVP.  • 2/9/12 - Sed rate  8.  • 2/29/12 - Patient given cycle 3 R-CVP.  • 3/5/12 - PET scan revealed improvement in the PET from prior study of 12/28/11 with less metabolic activity seen within a left supraclavicular lymph node as well as lymph node posterior mediastinum of the chest in addition to lymphadenopathy within the mesentery/periaortic region. Some decrease in the soft tissue in the root of the mesentery and at least prominent left periaortic retroperitoneal lymph node. Some chronic changes involving the lower lobes of the lungs. Aneurysmal dilatation of the ascending thoracic aorta similar to previous exam. Cyst in the lower pole of the right kidney. Umbilical hernia which appears to contain fat.  • 3/28/12 - Patient given cycle 4 R-CVP.  • 4/18/12 - Patient given cycle 5 R-CVP.  • 5/2/12 - Bone marrow with flow and results showed normal bone marrow. Normocellular bone marrow and absence of iron stores. Flow cytometry negative. Cytogenetics (N).  • 5/9/12 - Patient given cycle 6 R-CVP.  • 5/10/12 - Sedimentation rate 4.  • 5/16/12 - PET/CT scan revealed left supraclavicular enlarged lymph node appeared unchanged with slight decrease in hypermetabolic activity. Mediastinal and retroperitoneal lymph nodes have decreased in size and no longer demonstrate hypermetabolic activity. Ascending thoracic aorta aneurysm measuring 4.9 cm unchanged. 6/20/12 Stool OB x 1 - negative.  • 5/30/12 - Patient given cycle 7 R-CVP.  • 6/20/12 - Patient given cycle 8 R-CVP. Chemotherapy complete. Patient in remission of disease.  • 8/22/12 - Order written for patient to begin on maintenance Rituxan 750 mg IV every two months for two years.  • 9/6/12 - Patient given cycle 1 maintenance Rituxan.  • 10/4/12 - PET scan 1. The previously seen left supraclavicular lymph node has completely resolved. 2. Areas of increased density within the mesenteric fat and retroperitoneal fat have improved. 3. Negative for hypermetabolic foci in the neck, chest, abdomen, or  pelvis to suggest recurrent or progressive disease. 4. New sclerosis in the left oliver-sacrum with a somewhat linear configuration. I suspect this represents a left oliver sacral insufficiency fracture, which is healing. If the patient's clinical presentation does not support this, consider an MRI of the bony pelvis for further evaluation.  • 11/8/12 - Patient given cycle 2 maintenance Rituxan.  • 1/10/13 - Patient given cycle 3 maintenance Rituxan.  • 3/7/13 - Patient given cycle 4 maintenance Rituxan.  • 5/8/13 - Maintenance cycle 5 of Rituxan given.  • 7/11/13 - Maintenance cycle 6 of Rituxan given.  • 9/4/13 - The patient was given maintenance cycle 7 of Rituxan.  • 10/25/13 - CT of the chest, abdomen and pelvis revealed no evidence of adenopathy in the chest, abdomen and pelvis, increase in density in the mesenteric fat and tiny nodes have not increased in the interval.  Bilateral renal cysts and post cholecystectomy.  Stable aneurysmal dilation of the ascending aorta and stable mild aneurysmal dilation of the infrarenal abdominal aorta.  • 11/13/13 - The patient was given maintenance cycle 8 of Rituxan.  • 3/19/14 - The patient was given maintenance cycle 9 of Rituxan.  • 2/20/14 - Sedimentation rate 7 (N)  • 3/19/14 - The patient was given maintenance cycle 10 of Rituxan.  • 5/9/14 - x-rays of the abdomen showed no active cardiopulmonary disease, prior changes of CABG in left sided [id number] placement with tip in the superior right vena cava.  Chest appears stable.  Evaluation of the abdomen was limited due to patient’s body habitus.  However, and no free air or change of ileus or obstruction was seen.  • 5/21/14 - Patient was given maintenance cycle 11 of Rituxan.  • 6/24/14 - Orders written to give last Rituxan treatment next month due to completion of planned treatment course and clear x-rays.  • 7/23/14 - Patient received maintenance cycle 12 of Rituxan.  Treatment discontinued secondary to completion of  planned treatment course.  • 7/24/14 - Sed rate 7 (N).  • 1/19/16 - Comprehensive metabolic panel with no significant abnormality. ESR 14 (<21).    • 2/24/16 - CT abdomen and pelvis with development of retroperitoneal adenopathy. 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 Bilateral renal cysts stable to slightly smaller. CT chest with volume loss in the lung bases with nodular opacities in the lingula and right lung base abutting the mediastinal pleura. Retroperitoneal adenopathy is noted on CT abdomen and pelvis.   • 3/11/16 - PET scan with area of metabolically active adenopathy, predominantly in the retroperitoneum of the abdomen and pelvis. Right supraclavicular node mildly enlarged and metabolically active as well. Within the lingula abutting the pleura, there is a nodular opacity which is metabolically active with 3.45 maximum SUV. Similar-appearing nodular density abutting the right diaphragm in the right middle lobe is present.   • 3/14/16 - Patient complains of shortness of breath on exertion.  ().  • 3/29/16 - CT-guided abdominal lymph node biopsy revealed the presence of follicular lymphoma (grade 1-2, low grade). Patient underwent Infusaport insertion under fluoro guidance by Elda Foote M.D.   • 3/30/16 - Bone marrow aspiration performed revealing apparently normocellular bone marrow (30%) showing trilineage hematopoiesis with scattered megakaryocytes. Flow cytometry had no evidence of increase in blasts or lymphoproliferative disorder. There was decreased CD4/CD8 helper suppressor T-cell ratio and cytogenetics revealed normal karyotype.   • 3/31/16 - Echocardiogram with left ventricular ejection fraction estimated at 60-65% with left atrium and left and right atrium both borderline dilated and aortic root mildly dilated at 4 cm.    • 4/12/16 - IgA 119 (), IgG 1110 (600-1500), IgM 72 (). HIV 1 and 2 antibody negative.      • 4/18/16 - MRI brain with some T2 signal changes  involving both cerebral hemispheres reflecting possibly chronic small vessel ischemic change. There was some underlying cerebral atrophy and some periosteal mucosal thickening along the floor of the left medullary sinus.   • 4/21/16 - Lumbar puncture performed in IR by Víctro Mcdowell M.D. with flow cytometry revealing no monoclonal B-cells identified. Cytology hypocellular with rare scattered lymphocytes. Negative for malignant cells. CSF glucose was 60 (50-80), CSF LDH 30 (0-20), CSF protein was 40 (15-45). Meningitis encephalitis panel was negative. Total cell count was 3 with 2 lymphocytes and 1 monocyte.      • 4/27/16 - Hepatitis B and C screen negative.   • 5/2/16 - Comprehensive metabolic panel with no significant abnormality. Patient started cycle 1 chemotherapy Rituxan 375 mg/M2 IV day 1, Bendeka 90 mg/M2 IV days 1 and 2 to repeat cycle every four weeks.   • 5/31/16 - Patient received cycle 2 chemotherapy.   • 6/9/16 - ESR 12 (<21).    • 6/28/16 - Potassium 3.3 (3.6-5.1). KCL 20 mEq p.o. q. d. started. Patient received cycle 3 chemotherapy.   • 7/6/16 - Magnesium 1.7 (1.8-2.5). Mag Oxide 400 mg p.o. q. d. started.   • 7/11/16 - Patient did not get CT’s done on time as appointments could not be made because of not getting a hold of him due to his phone not working. TSH 1.11 (0.34-5.6), magnesium 1.8 (1.8-2.5).     • 7/14/16 - CT abdomen and pelvis with significant interval improvement from PET scan of 3/11/16. Only small node remaining in the right pelvis, benign by size criteria. Other areas of adenopathy demonstrating only mild streaky density in the mesenteric fat. Marked interval improvement from previous PET study of 3/11/16. CT chest with supraclavicular lymph node seen on PET study of 3/11/16 not imaged on current CT chest due to high position. No evidence of adenopathy or mass within the chest. Basilar scarring changes and/or atelectasis present with no acute findings seen.   • 7/19/16 - Patient  complained of low blood pressure at home. Lisinopril decreased from 40 to 20 mg daily.   • 7/26/16 - Comprehensive metabolic panel with creatinine 1.3 (0.7-1.2).  Platelet count 56,000. Chemotherapy delayed by a week.   • 8/2/16 - Patient received cycle 4 chemotherapy with WBC 3.6, hemoglobin 12.8, platelet count 88,000.   • 8/15/16 - WBC 3.89, hemoglobin 13.3, platelet count 85,000. Chemotherapy dose decreased by 10% because of thrombocytopenia. PSA 1.92 (0-4).    • 9/8/16 - Patient received cycle 5 chemotherapy.   • 9/20/16 - ESR 44 (<21).    • 10/4/16 - CT chest stable from 7/14/16 with no evidence of adenopathy. Mild basilar scarring and/or atelectasis changes were seen again. CT abdomen and pelvis with no evidence of residual or recurrent disease. Small benign node noted in the right pelvis, slightly smaller from exam of 7/14/16 with mild streaky density in the mesenteric fat consistent with response to therapy. CT neck within normal limits with right-sided Infusaport. Prominent cervical node in the base of the neck on prior PET study of 3/11/16 no longer present.   • 10/6/16 - Comprehensive metabolic panel with no significant abnormality. Creatinine 1.2 (0.7-1.2).  Patient received cycle 6 chemotherapy and then discontinued.    • 10/27/16 - WBC 1.9 with 47% neutrophils, 31% lymphocytes, hemoglobin 11.3, .9, platelet count 62,000. Maintenance Rituxan discussed and patient wants to proceed. Order written for Rituxan 375 mg/M2 IV q. two months times two years.   • 12/1/16 - Patient received cycle 1 maintenance Rituxan.   • 12/29/16 - Comprehensive metabolic panel with creatinine 1.6 (0.7-1.2), total bilirubin 1.4 (0.3-1.2), alkaline phosphatase 96 (32-91). Transaminases normal. TSH 3.5 (0.34-5.6).   • 1/30/17 - Rituxan infusions continued. Sed rate 29 high (<21).    • 3/28/17 - WBC 3.3, ANC 1.8 with 56% granulocytes, 28% lymphocytes, 13% monocytes, hemoglobin 13.4, MCV 99.5, platelet count 80,000.    • 4/25/17 - CT of the chest with contrast which showed no adenopathy in the chest, areas of scarring or atelectasis within the lingula and the left lower lobe similar to previous study. No new airspace consolidation or pulmonary nodules. CT of the abdomen and pelvis with contrast with no evidence of abdominal, pelvic or retroperitoneal adenopathy, stable bilateral renal cysts, status post cholecystectomy, colonic diverticulosis without evidence of diverticulitis.   • 5/30/17 - WBC 3.1, hemoglobin 13.2, .8, platelet count 79,000.   • 6/8/17 - Comprehensive metabolic panel with no significant abnormality.   • 8/9/17 - Patient complains of occasional nausea and heartburn. Omeprazole 40 mg q.d. prescribed and maintenance Rituxan continued. Sed rate 3 normal (<21). ESR 3 (<21).   • 8/11/17 - CMP which revealed creatinine of 1.3 high (0.7-1.2) consistent with patient’s CKD. Total calcium 8.7 low (8.9-10.3), total protein 5.9 low (6.1-7.9), albumin 3.4 low (3.5-4.8). Otherwise without clinical significance. Patient also began cycle 5 of maintenance Rituxan.     • 10/3/17 - Cycle 6 Rituximab given.      • 12/1/17 - 10 pound weight loss in two months, intentional, due to dietary changes.   • 12/8/17 - Patient received cycle 7 maintenance Rituxan.   • 12/30/17 - Chest x-ray with cardiomegaly, bilateral basilar scarring, probable emphysema.   • 1/25/18 - CT abdomen and pelvis done in the Emergency Room for abdominal pains with small right adrenal myolipoma and small fat-containing left inguinal hernia.   • 2/28/18 - CT chest with stable areas of bibasilar scarring.  • 4/6/18 - Cycle 9 maintenance rituximab given.  • 4/22/18 - CT head without contrast with no acute intracranial hemorrhage or mass effect.  Patchy periventricular and subcortical white matter low attenuation, statistically representing indeterminate small-vessel ischemic changes with scattered tiny low-density lesions in the supratentorial and  infratentorial brain, likely representing old lacunar infarcts.  Chest x-ray with stable right IJ central venous port catheter and low lung volumes with bibasilar atelectasis and/or scarring.    • 5/15/18 - Patient complains of not feeling well, fatigue, confusion, body aches and swelling with fingers tingling.  He wants to stop maintenance rituximab and Coumadin.  Rituximab discontinued.  Dr. Chang consulted. Echocardiogram and bilateral carotid Dopplers ordered. Encouraged to keep on taking Warfarin while doing further workup.  ESR 22 (H). CMP unremarkable.   • 6/19/18 - ESR 24 (0-20).    • 7/23/18 - Patient claims not to have seen the neurologist yet. Encouraged him to do that.   • 10/2/18 - Patient claims Dr. Machado told him no need to see a neurologist and has not had carotid Dopplers either. Sed rate 15 (0-20).   • 10/31/18 - Chest x-ray showing chronic stable findings without superimposed acute processes.   • 2/7/19 - CT chest, abdomen and pelvis with no evidence of recurrent disease or lymphadenopathy. Mild emphysematous and fibrotic changes in both lungs stable. Dilated ascending aorta measuring up to 4.9 cm in diameter. Postop changes of CABG. Stable 2 cm right adrenal myolipoma and incidental findings of bilateral renal cysts and left inguinal hernia.   • 4/11/2019- creatinine 1.3 (H), total bili 1.6 (H), ESR 20 (N) 6.  2. Recurrent DVT and PE’s diagnosis established in April 2014.  • 6/22/12 - Venous Doppler of the RLE-Doppler was negative.  • 4/1/14 - 4/4/14 - Patient was admitted to [facility] due to pulmonary emboli and thoracic aortic aneurysm which was found on in outpatient CT scan.  Upon CT scan of the chest did show changes consistent with pulmonary emboli and a thoracic aortic aneurysm which were stable.  Venous duplex of the lower extremities were negative.  He was started on Lovenox and Coumadin while in the hospital and was advised to continue the Lovenox and Coumadin until  directed otherwise.  ATIII 105 (N), fibrinogen 451 (H), d-dimer 1.01 (H), prothrombin gene not detected.  Factor V Leiden mutation screen 2.2 (N), protein C 88.5 (N), protein S 33.7 (L),    • 4/5/14 - Advised patient to discontinue Lovenox due to INR within therapeutic range.  INR 2.5  • 4/8/14 - Prothrombin gene not detected.  • 4/9/14 - Patient is currently taking 4 mg of Coumadin daily.  INR 2.8. Given the history of repeat DVT’s and PE’s he should remain on lifelong anticoagulation.    • 2/2/15 - PT INR 27.3/2.3 therapeutic.  Patient is to continue with monthly PT/INR per protocol.  • 1/19/16 - Patient returns to the clinic after almost one year’s absence. Claims was taken off Coumadin for his last cardiac catheterization, but never restarted. Asked to start back on Coumadin 4 mg q. d. and follow INR protocol. Discussed use of newer drugs, but patient would rather stay on Coumadin. Pro-time 11.6, INR 1.0, PTT 26.9 (23.6-35.6). Protein S antigen 121% (), factor X antigen 88% ().     • 4/13/16 - Protein S activity 31 ().    • 12/31/17 - Echocardiogram showed normal left ventricle with ejection fraction 55-60%. No thrombus. Suggestion of impaired LV relaxation. Trace tricuspid regurgitation.   • 8/13/18 - Patient seen in followup by Dr. Machado for chronic heart failure and was continued on Metoprolol as well as seen for paroxysmal AFib. Continued on Coumadin. CAD status post CABG status post PTCA and stent. He was recommended to return for followup visit in nine months.   • 8/27/18 - Carotid Doppler ordered July 2018 not done. Patient reporting Dr. Machado instructed patient it was not necessary.   3. Vitamin B12 deficiency diagnosis originally established in 2009 by Dr. Machado.  • 10/19/11 - Vitamin B12 590 pg/mL (180-914), folate 16.12 ng/mL (340), liver enzymes (N) except bilirubin 1.8 mg/dL (0.3-1.2).  • [facility]  • 12/19/11 - Intrinsic factor blocking  antibody negative, gastric parietal cell antibody normal.  • 1/10/13 - Discussed change from B12 injections to oral B12. The patient wishes to continue B12 injections.  • 1/19/16 - [facility].   • 10/27/16 - [facility] and hence prescription given.   • 12/30/17 - Vitamin B12 of 575 (180-914).    • 10/2/18 - Patient claims to be getting Vitamin B12 injections monthly through the [facility] and does not want to switch to the pills.   • 8/14/2019-hgb 12.7, MCV 99.5.  4. Thrombocytopenia diagnosis established December 2011.  • 12/19/11 - Platelet count 94,000. PT 10.0, INR 1.0, Platelet antibodies negative. PTT 26 (N), MATTHEW negative, CMV IgG 4.50 (H), CMV IgM normal, EBV IgM normal, EBV IgM >5.00 (H).  • 5/30/17 - Platelet count 79,000 which is stable. WBC 3.1 with 61% granulocytes, 23% lymphocytes, 14% monocytes, hemoglobin 13.2 and .8.   • 8/14/2019-platelets 103k.  5. 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  • Port dye study revealed a malpositioned port with associated fibrin sheath. Blood return restored with t-PA.  • 3/7/13 - Patient started on Coumadin 1 mg QD for port maintenance, changed to full dose Coumadin secondary to DVT and PE’s.   Past Medical History:   Diagnosis Date   • Atrial fibrillation (CMS/HCC)    • CKD (chronic kidney disease)    • COPD (chronic obstructive pulmonary disease) (CMS/HCC) 2000   • DJD (degenerative joint disease)    • Dyslipidemia    • Hypertension    • Rheumatoid arthritis (CMS/HCC)        Past Surgical History:   Procedure Laterality Date   • CHOLECYSTECTOMY  1980   • CORONARY ARTERY BYPASS GRAFT  1999   • HERNIA REPAIR  1970   • REPLACEMENT TOTAL KNEE           Current Outpatient Medications:   •  aspirin 325 MG tablet, ASPIRIN 325 MG TABS, Disp: , Rfl:   •  atorvastatin (LIPITOR) 80 MG tablet, Daily., Disp: , Rfl:   •  Calcium Carbonate-Vitamin D  (CALCIUM 500 + D) 500-125 MG-UNIT tablet, CALCIUM 500 + D 500-125 MG-UNIT TABS, Disp: , Rfl:   •  cholecalciferol (VITAMIN D3) 400 units tablet, VITAMIN D3 400 UNIT TABS, Disp: , Rfl:   •  cyanocobalamin 1000 MCG/ML injection, Inject 1 mL into the appropriate muscle as directed by prescriber Every 30 (Thirty) Days., Disp: 3 mL, Rfl: 4  •  diazePAM (VALIUM) 5 MG tablet, DIAZEPAM 5 MG TABS, Disp: , Rfl:   •  docusate sodium (CVS STOOL SOFTENER) 50 MG capsule, CVS STOOL SOFTENER 50 MG CAPS, Disp: , Rfl:   •  doxepin (SINEquan) 100 MG capsule, DOXEPIN  MG CAPS, Disp: , Rfl:   •  escitalopram (LEXAPRO) 20 MG tablet, Daily., Disp: , Rfl:   •  folic acid (FOLVITE) 1 MG tablet, FOLIC ACID 1 MG TABS, Disp: , Rfl:   •  HYDROcodone-acetaminophen (NORCO) 5-325 MG per tablet, Take 1 tablet by mouth Every 12 (Twelve) Hours As Needed., Disp: , Rfl: 0  •  ipratropium-albuterol (DUO-NEB) 0.5-2.5 mg/3 ml nebulizer, IPRATROPIUM-ALBUTEROL 0.5-2.5 (3) MG/3ML SOLN, Disp: , Rfl:   •  isosorbide mononitrate (IMDUR) 60 MG 24 hr tablet, Daily., Disp: , Rfl:   •  Magnesium Oxide 400 (240 Mg) MG tablet, TAKE ONE TABLET BY MOUTH EVERY DAY, Disp: 30 tablet, Rfl: 7  •  meclizine (ANTIVERT) 25 MG tablet, MECLIZINE HCL 25 MG TABS, Disp: , Rfl:   •  metoprolol tartrate (LOPRESSOR) 50 MG tablet, Every 12 (Twelve) Hours., Disp: , Rfl:   •  nitroglycerin (NITROSTAT) 0.4 MG SL tablet, NITROSTAT 0.4 MG SUBL, Disp: , Rfl:   •  pantoprazole (PROTONIX) 40 MG EC tablet, Take 1 tablet by mouth Daily., Disp: 30 tablet, Rfl: 3  •  potassium chloride (K-DUR,KLOR-CON) 20 MEQ CR tablet, TAKE ONE TABLET BY MOUTH EVERY DAY, Disp: 30 tablet, Rfl: 3  •  raNITIdine (ZANTAC) 150 MG tablet, RANITIDINE  MG TABS, Disp: , Rfl:   •  sodium chloride (OCEAN) 0.65 % nasal spray, SODIUM CHLORIDE 0.65 % SOLN, Disp: , Rfl:   •  warfarin (COUMADIN) 1 MG tablet, Take 1 tablet by mouth Daily., Disp: 30 tablet, Rfl: 3  •  warfarin (COUMADIN) 3 MG tablet, Take 1 tablet by  mouth Daily., Disp: 30 tablet, Rfl: 3  •  zolpidem (AMBIEN) 10 MG tablet, AMBIEN 10 MG TABS, Disp: , Rfl:   No current facility-administered medications for this visit.     Facility-Administered Medications Ordered in Other Visits:   •  heparin flush (porcine) 100 UNIT/ML injection 500 Units, 500 Units, Intravenous, PRN, Vikash Burris MD, 500 Units at 19 0822  •  sodium chloride 0.9 % flush 10 mL, 10 mL, Intravenous, PRN, Vikash Burris MD, 30 mL at 19 0821    No Known Allergies    Family History   Problem Relation Age of Onset   • Hyperlipidemia Mother    • Hypertension Mother    • Heart disease Mother    • Diabetes Mother    • Heart attack Mother    • Aneurysm Father    • Aneurysm Sister    • Cancer Son        Cancer-related family history includes Cancer in his son.    Social History     Tobacco Use   • Smoking status: Former Smoker     Last attempt to quit: 1980     Years since quittin.6   • Smokeless tobacco: Never Used   Substance Use Topics   • Alcohol use: No     Frequency: Never   • Drug use: No       I have reviewed the history of present illness, past medical history, family history, social history, lab results, all notes and other records since the patient was last seen on 2019.    SUBJECTIVE:  Patient states that he has gained 10 pounds.  [facility] He states that he has a productive cough and is coughing up light yellow sputum and it has been going on for about 7 months, but on some days he has no cough or sputum.  He had some frequent urination but [facility].  He does not drink any alcohol.  He states that he takes 2 Tylenol a couple times a week.  Dr. Sky is now his cardiologist.  Yamileth Barbosa CMA ([facility]) was present during office visit.           REVIEW OF SYSTEMS:  Review of Systems   Constitutional: Positive for unexpected weight change (gained 10 pounds). Negative for diaphoresis,  "fatigue and fever.   HENT: Negative for congestion and nosebleeds.    Eyes: Negative.    Respiratory: Positive for cough (productive, x 7 months) and shortness of breath.    Cardiovascular: Negative for chest pain and leg swelling.   Gastrointestinal: Positive for constipation. Negative for abdominal pain, blood in stool, diarrhea, nausea and vomiting.   Endocrine: Negative for cold intolerance and heat intolerance.   Genitourinary: Negative for dysuria and hematuria.   Musculoskeletal: Negative for arthralgias and joint swelling.   Skin: Negative for rash and wound.   Neurological: Positive for headaches. Negative for numbness.   Hematological: Does not bruise/bleed easily.   Psychiatric/Behavioral: Negative for confusion. The patient is not nervous/anxious.    All other systems reviewed and are negative.      OBJECTIVE:    Vitals:    08/14/19 0841   BP: 120/77   Pulse: 77   Resp: 20   Temp: 98 °F (36.7 °C)   Weight: (!) 136 kg (300 lb 6.4 oz)   Height: 185.4 cm (73\")   PainSc: 0-No pain       ECOG  (1) Restricted in physically strenuous activity, ambulatory and able to do work of light nature    Physical Exam   Constitutional: He is oriented to person, place, and time. He appears well-developed and well-nourished.   Alone.  Morbid obesity.   HENT:   Head: Normocephalic and atraumatic.   Mouth/Throat: Oropharynx is clear and moist.   Eyes: Conjunctivae, EOM and lids are normal. Pupils are equal, round, and reactive to light.   Neck: Normal range of motion. Neck supple. No thyromegaly present.   Cardiovascular: Normal rate, regular rhythm and normal heart sounds.   No murmur heard.  Pulmonary/Chest: Effort normal and breath sounds normal. No respiratory distress.   Abdominal: Soft. Normal appearance and bowel sounds are normal. He exhibits no distension.   Genitourinary:   Genitourinary Comments: Deferred.   Musculoskeletal: Normal range of motion. He exhibits edema (1+ or RLE, Trace on LLE).   Lymphadenopathy:     " He has no cervical adenopathy.     He has no axillary adenopathy.        Right: No supraclavicular adenopathy present.        Left: No supraclavicular adenopathy present.   Neurological: He is alert and oriented to person, place, and time.   Skin: Skin is warm and dry. Capillary refill takes less than 2 seconds. No bruising, no petechiae and no rash noted.   Tattoos bilateral arms   Psychiatric: He has a normal mood and affect. His speech is normal and behavior is normal. Judgment and thought content normal. Cognition and memory are normal.   Nursing note and vitals reviewed.      RECENT LABS  WBC   Date Value Ref Range Status   08/14/2019 4.75 3.40 - 10.80 10*3/mm3 Final     RBC   Date Value Ref Range Status   08/14/2019 3.92 (L) 4.14 - 5.80 10*6/mm3 Final     Hemoglobin   Date Value Ref Range Status   08/14/2019 12.7 (L) 13.0 - 17.7 g/dL Final     Hematocrit   Date Value Ref Range Status   08/14/2019 39.0 37.5 - 51.0 % Final     MCV   Date Value Ref Range Status   08/14/2019 99.5 (H) 79.0 - 97.0 fL Final     MCH   Date Value Ref Range Status   08/14/2019 32.4 26.6 - 33.0 pg Final     MCHC   Date Value Ref Range Status   08/14/2019 32.6 31.5 - 35.7 g/dL Final     RDW   Date Value Ref Range Status   08/14/2019 13.3 12.3 - 15.4 % Final     RDW-SD   Date Value Ref Range Status   08/14/2019 47.5 37.0 - 54.0 fl Final     MPV   Date Value Ref Range Status   08/14/2019 9.9 6.0 - 12.0 fL Final     Platelets   Date Value Ref Range Status   08/14/2019 103 (L) 140 - 450 10*3/mm3 Final     Neutrophil %   Date Value Ref Range Status   08/14/2019 56.4 42.7 - 76.0 % Final     Lymphocyte %   Date Value Ref Range Status   08/14/2019 30.3 19.6 - 45.3 % Final     Monocyte %   Date Value Ref Range Status   08/14/2019 11.8 5.0 - 12.0 % Final     Eosinophil %   Date Value Ref Range Status   08/14/2019 1.3 0.3 - 6.2 % Final     Basophil %   Date Value Ref Range Status   08/14/2019 0.2 0.0 - 1.5 % Final     Neutrophils, Absolute   Date  Value Ref Range Status   08/14/2019 2.68 1.70 - 7.00 10*3/mm3 Final     Lymphocytes, Absolute   Date Value Ref Range Status   08/14/2019 1.44 0.70 - 3.10 10*3/mm3 Final     Monocytes, Absolute   Date Value Ref Range Status   08/14/2019 0.56 0.10 - 0.90 10*3/mm3 Final     Eosinophils, Absolute   Date Value Ref Range Status   08/14/2019 0.06 0.00 - 0.40 10*3/mm3 Final     Basophils, Absolute   Date Value Ref Range Status   08/14/2019 0.01 0.00 - 0.20 10*3/mm3 Final     nRBC   Date Value Ref Range Status   10/31/2018 0 0 /100[WBCs] Final       Lab Results   Component Value Date    GLUCOSE 128 (H) 04/11/2019    BUN 13 04/11/2019    CREATININE 1.3 (H) 04/11/2019    EGFRIFAFRI >60 02/07/2019    BCR 10.0 04/11/2019    K 4.2 04/11/2019    CO2 24 04/11/2019    CALCIUM 8.5 (L) 04/11/2019    ALBUMIN 3.6 04/11/2019    LABIL2 1.4 04/11/2019    AST 20 04/11/2019    ALT 14 (L) 04/11/2019           ASSESSMENT:  1.   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  ESR last visit was normal.  Denies any B symptoms.  Continue monthly port flush.  2.   Recurrent DVT’s and PE’s: Continue Coumadin and INR protocol.  3.   Vitamin B12 deficiency: [facility]  5.   Pancytopenia: White blood cell count normal.  Hemoglobin trending lower.  Platelet count remains low stable.  Additional work-up ordered.      PLAN:     1. Ordered retic, platelet antibody screen, CMP and ESR  2. Monthly port flush  3. Follow INR protocol continue Coumadin  4. [facility].   5. Iron studies, SPEP, haptoglobin, CMV/EBV IgM's and MATTHEW to be done at next visit.         I have reviewed labs results, imaging, vitals, and medications with the patient today and have reviewed information entered by Yamileth Barbosa CMA ([facility]).   Will follow up in four months with the physician and a CBC.    Patient verbalized understanding and is in agreement of the above  plan.    Electronically signed by MIO Snow, 08/14/19, 4:50 PM.    Much of the above report is an electronic transcription/translation of the spoken language to printed text using Dragon Software. As such, the subtleties and finesse of the spoken language may permit erroneous, or at times, nonsensical words or phrases to be inadvertently transcribed; thus changes may be made at a later date to rectify these errors.

## 2019-08-14 ENCOUNTER — HOSPITAL ENCOUNTER (OUTPATIENT)
Dept: ONCOLOGY | Facility: HOSPITAL | Age: 79
Discharge: HOME | End: 2019-08-14

## 2019-08-14 ENCOUNTER — OFFICE VISIT (OUTPATIENT)
Dept: ONCOLOGY | Facility: CLINIC | Age: 79
End: 2019-08-14

## 2019-08-14 ENCOUNTER — HOSPITAL ENCOUNTER (OUTPATIENT)
Dept: ONCOLOGY | Facility: HOSPITAL | Age: 79
Discharge: HOME | End: 2019-08-14
Admitting: NURSE PRACTITIONER

## 2019-08-14 ENCOUNTER — LAB (OUTPATIENT)
Dept: LAB | Facility: HOSPITAL | Age: 79
End: 2019-08-14

## 2019-08-14 VITALS
SYSTOLIC BLOOD PRESSURE: 120 MMHG | TEMPERATURE: 98 F | RESPIRATION RATE: 20 BRPM | HEIGHT: 73 IN | HEART RATE: 77 BPM | WEIGHT: 300.4 LBS | BODY MASS INDEX: 39.81 KG/M2 | DIASTOLIC BLOOD PRESSURE: 77 MMHG

## 2019-08-14 VITALS
WEIGHT: 300 LBS | TEMPERATURE: 98.1 F | HEART RATE: 83 BPM | SYSTOLIC BLOOD PRESSURE: 118 MMHG | BODY MASS INDEX: 40.63 KG/M2 | DIASTOLIC BLOOD PRESSURE: 85 MMHG | RESPIRATION RATE: 18 BRPM | HEIGHT: 72 IN

## 2019-08-14 DIAGNOSIS — D64.9 ANEMIA, UNSPECIFIED TYPE: ICD-10-CM

## 2019-08-14 DIAGNOSIS — D69.1 THROMBOCYTOPATHIA (HCC): ICD-10-CM

## 2019-08-14 DIAGNOSIS — C82.90 FOLLICULAR NON-HODGKIN'S LYMPHOMA (HCC): ICD-10-CM

## 2019-08-14 DIAGNOSIS — I82.5Y9 CHRONIC DEEP VEIN THROMBOSIS (DVT) OF PROXIMAL VEIN OF LOWER EXTREMITY, UNSPECIFIED LATERALITY (HCC): ICD-10-CM

## 2019-08-14 DIAGNOSIS — D61.818 PANCYTOPENIA (HCC): ICD-10-CM

## 2019-08-14 DIAGNOSIS — Z45.2 ENCOUNTER FOR CARE RELATED TO VASCULAR ACCESS PORT: Primary | ICD-10-CM

## 2019-08-14 DIAGNOSIS — C82.90 FOLLICULAR NON-HODGKIN'S LYMPHOMA (HCC): Primary | ICD-10-CM

## 2019-08-14 DIAGNOSIS — E53.8 VITAMIN B12 DEFICIENCY: ICD-10-CM

## 2019-08-14 DIAGNOSIS — I27.82 OTHER CHRONIC PULMONARY EMBOLISM WITHOUT ACUTE COR PULMONALE (HCC): ICD-10-CM

## 2019-08-14 DIAGNOSIS — N18.30 CKD (CHRONIC KIDNEY DISEASE), STAGE III (HCC): ICD-10-CM

## 2019-08-14 LAB
ALBUMIN SERPL-MCNC: 3.5 G/DL (ref 3.5–4.8)
ALBUMIN/GLOB SERPL: 1.5 G/DL (ref 1–1.7)
ALP SERPL-CCNC: 73 U/L (ref 32–91)
ALT SERPL W P-5'-P-CCNC: 13 U/L (ref 17–63)
ANION GAP SERPL CALCULATED.3IONS-SCNC: 13 MMOL/L (ref 5–15)
AST SERPL-CCNC: 16 U/L (ref 15–41)
BASOPHILS # BLD AUTO: 0.01 10*3/MM3 (ref 0–0.2)
BASOPHILS NFR BLD AUTO: 0.2 % (ref 0–1.5)
BILIRUB SERPL-MCNC: 1.2 MG/DL (ref 0.3–1.2)
BUN BLD-MCNC: 17 MG/DL (ref 8–20)
BUN/CREAT SERPL: 11.3 (ref 6.2–20.3)
CALCIUM SPEC-SCNC: 8.4 MG/DL (ref 8.9–10.3)
CHLORIDE SERPL-SCNC: 104 MMOL/L (ref 101–111)
CO2 SERPL-SCNC: 24 MMOL/L (ref 22–32)
CREAT BLD-MCNC: 1.5 MG/DL (ref 0.7–1.2)
DEPRECATED RDW RBC AUTO: 47.5 FL (ref 37–54)
EOSINOPHIL # BLD AUTO: 0.06 10*3/MM3 (ref 0–0.4)
EOSINOPHIL NFR BLD AUTO: 1.3 % (ref 0.3–6.2)
ERYTHROCYTE [DISTWIDTH] IN BLOOD BY AUTOMATED COUNT: 13.3 % (ref 12.3–15.4)
ERYTHROCYTE [SEDIMENTATION RATE] IN BLOOD: 15 MM/HR (ref 0–20)
GFR SERPL CREATININE-BSD FRML MDRD: 45 ML/MIN/1.73
GLOBULIN UR ELPH-MCNC: 2.4 GM/DL (ref 2.5–3.8)
GLUCOSE BLD-MCNC: 135 MG/DL (ref 65–99)
HCT VFR BLD AUTO: 39 % (ref 37.5–51)
HGB BLD-MCNC: 12.7 G/DL (ref 13–17.7)
INR PPP: 1.9
LYMPHOCYTES # BLD AUTO: 1.44 10*3/MM3 (ref 0.7–3.1)
LYMPHOCYTES NFR BLD AUTO: 30.3 % (ref 19.6–45.3)
MCH RBC QN AUTO: 32.4 PG (ref 26.6–33)
MCHC RBC AUTO-ENTMCNC: 32.6 G/DL (ref 31.5–35.7)
MCV RBC AUTO: 99.5 FL (ref 79–97)
MONOCYTES # BLD AUTO: 0.56 10*3/MM3 (ref 0.1–0.9)
MONOCYTES NFR BLD AUTO: 11.8 % (ref 5–12)
NEUTROPHILS # BLD AUTO: 2.68 10*3/MM3 (ref 1.7–7)
NEUTROPHILS NFR BLD AUTO: 56.4 % (ref 42.7–76)
PLATELET # BLD AUTO: 103 10*3/MM3 (ref 140–450)
PMV BLD AUTO: 9.9 FL (ref 6–12)
POTASSIUM BLD-SCNC: 4 MMOL/L (ref 3.6–5.1)
PROT SERPL-MCNC: 5.9 G/DL (ref 6.1–7.9)
RBC # BLD AUTO: 3.92 10*6/MM3 (ref 4.14–5.8)
RETICS # AUTO: 0.07 10*6/MM3 (ref 0.02–0.13)
RETICS/RBC NFR AUTO: 1.66 % (ref 0.7–1.9)
SODIUM BLD-SCNC: 137 MMOL/L (ref 136–144)
WBC NRBC COR # BLD: 4.75 10*3/MM3 (ref 3.4–10.8)

## 2019-08-14 PROCEDURE — 85025 COMPLETE CBC W/AUTO DIFF WBC: CPT

## 2019-08-14 PROCEDURE — 85610 PROTHROMBIN TIME: CPT

## 2019-08-14 PROCEDURE — 80053 COMPREHEN METABOLIC PANEL: CPT | Performed by: NURSE PRACTITIONER

## 2019-08-14 PROCEDURE — 85045 AUTOMATED RETICULOCYTE COUNT: CPT | Performed by: NURSE PRACTITIONER

## 2019-08-14 PROCEDURE — 86022 PLATELET ANTIBODIES: CPT | Performed by: NURSE PRACTITIONER

## 2019-08-14 PROCEDURE — 85652 RBC SED RATE AUTOMATED: CPT | Performed by: NURSE PRACTITIONER

## 2019-08-14 PROCEDURE — 99214 OFFICE O/P EST MOD 30 MIN: CPT | Performed by: NURSE PRACTITIONER

## 2019-08-14 RX ORDER — SODIUM CHLORIDE 0.9 % (FLUSH) 0.9 %
10 SYRINGE (ML) INJECTION AS NEEDED
Status: CANCELLED | OUTPATIENT
Start: 2019-08-14

## 2019-08-14 RX ORDER — CYANOCOBALAMIN 1000 UG/ML
1000 INJECTION, SOLUTION INTRAMUSCULAR; SUBCUTANEOUS
Qty: 3 ML | Refills: 4
Start: 2019-08-14

## 2019-08-14 RX ORDER — WARFARIN SODIUM 1 MG/1
1 TABLET ORAL DAILY
Qty: 30 TABLET | Refills: 3 | Status: SHIPPED | OUTPATIENT
Start: 2019-08-14 | End: 2019-12-13 | Stop reason: SDUPTHER

## 2019-08-14 RX ORDER — SODIUM CHLORIDE 0.9 % (FLUSH) 0.9 %
10 SYRINGE (ML) INJECTION AS NEEDED
Status: DISCONTINUED | OUTPATIENT
Start: 2019-08-14 | End: 2019-08-15 | Stop reason: HOSPADM

## 2019-08-14 RX ORDER — POTASSIUM CHLORIDE 20 MEQ/1
TABLET, EXTENDED RELEASE ORAL
Qty: 30 TABLET | Refills: 3 | Status: SHIPPED | OUTPATIENT
Start: 2019-08-14

## 2019-08-14 RX ORDER — WARFARIN SODIUM 3 MG/1
3 TABLET ORAL DAILY
Qty: 30 TABLET | Refills: 3 | Status: SHIPPED | OUTPATIENT
Start: 2019-08-14

## 2019-08-14 RX ADMIN — Medication 30 ML: at 08:21

## 2019-08-16 ENCOUNTER — HOSPITAL ENCOUNTER (OUTPATIENT)
Dept: ONCOLOGY | Facility: HOSPITAL | Age: 79
Discharge: HOME | End: 2019-08-16
Admitting: NURSE PRACTITIONER

## 2019-08-16 ENCOUNTER — LAB (OUTPATIENT)
Dept: LAB | Facility: HOSPITAL | Age: 79
End: 2019-08-16

## 2019-08-16 ENCOUNTER — TELEPHONE (OUTPATIENT)
Dept: ONCOLOGY | Facility: HOSPITAL | Age: 79
End: 2019-08-16

## 2019-08-16 DIAGNOSIS — I27.82 OTHER CHRONIC PULMONARY EMBOLISM WITHOUT ACUTE COR PULMONALE (HCC): Primary | ICD-10-CM

## 2019-08-16 LAB
INR PPP: 2.2
PROTHROMBIN TIME: 27.9 SECONDS (ref 11–15)

## 2019-08-16 PROCEDURE — 85610 PROTHROMBIN TIME: CPT

## 2019-08-18 LAB
PLAT GP IA/IIA AB BLD QL IA: NEGATIVE
PLAT GP IB/IX AB BLD QL IA: NEGATIVE
PLAT GP IIB/IIIA IGG BLD QL IA: NEGATIVE

## 2019-08-23 ENCOUNTER — LAB (OUTPATIENT)
Dept: LAB | Facility: HOSPITAL | Age: 79
End: 2019-08-23

## 2019-08-23 ENCOUNTER — HOSPITAL ENCOUNTER (OUTPATIENT)
Dept: ONCOLOGY | Facility: HOSPITAL | Age: 79
Discharge: HOME | End: 2019-08-23
Admitting: NURSE PRACTITIONER

## 2019-08-23 VITALS
WEIGHT: 301 LBS | SYSTOLIC BLOOD PRESSURE: 140 MMHG | DIASTOLIC BLOOD PRESSURE: 80 MMHG | HEIGHT: 73 IN | RESPIRATION RATE: 18 BRPM | BODY MASS INDEX: 39.89 KG/M2 | HEART RATE: 80 BPM | TEMPERATURE: 98.3 F

## 2019-08-23 DIAGNOSIS — I27.82 OTHER CHRONIC PULMONARY EMBOLISM WITHOUT ACUTE COR PULMONALE (HCC): Primary | ICD-10-CM

## 2019-08-23 DIAGNOSIS — I82.5Y9 CHRONIC DEEP VEIN THROMBOSIS (DVT) OF PROXIMAL VEIN OF LOWER EXTREMITY, UNSPECIFIED LATERALITY (HCC): ICD-10-CM

## 2019-08-23 LAB — INR PPP: 2.5

## 2019-08-23 PROCEDURE — 85610 PROTHROMBIN TIME: CPT

## 2019-09-04 ENCOUNTER — TELEPHONE (OUTPATIENT)
Dept: ONCOLOGY | Facility: CLINIC | Age: 79
End: 2019-09-04

## 2019-09-04 NOTE — TELEPHONE ENCOUNTER
Patient called and left message with question regarding labs.  Attempted to call him back and had to LM on VM asking him to call me back. Paco, caty

## 2019-09-18 ENCOUNTER — APPOINTMENT (OUTPATIENT)
Dept: ONCOLOGY | Facility: HOSPITAL | Age: 79
End: 2019-09-18

## 2019-09-18 ENCOUNTER — LAB (OUTPATIENT)
Dept: LAB | Facility: HOSPITAL | Age: 79
End: 2019-09-18

## 2019-09-20 ENCOUNTER — LAB (OUTPATIENT)
Dept: LAB | Facility: HOSPITAL | Age: 79
End: 2019-09-20

## 2019-09-20 ENCOUNTER — HOSPITAL ENCOUNTER (OUTPATIENT)
Dept: ONCOLOGY | Facility: HOSPITAL | Age: 79
Discharge: HOME | End: 2019-09-20
Admitting: INTERNAL MEDICINE

## 2019-09-20 VITALS
DIASTOLIC BLOOD PRESSURE: 82 MMHG | WEIGHT: 307 LBS | SYSTOLIC BLOOD PRESSURE: 140 MMHG | TEMPERATURE: 98.2 F | RESPIRATION RATE: 18 BRPM | HEART RATE: 83 BPM | HEIGHT: 73 IN | BODY MASS INDEX: 40.69 KG/M2

## 2019-09-20 DIAGNOSIS — Z45.2 ENCOUNTER FOR CARE RELATED TO VASCULAR ACCESS PORT: Primary | ICD-10-CM

## 2019-09-20 DIAGNOSIS — I82.5Y9 CHRONIC DEEP VEIN THROMBOSIS (DVT) OF PROXIMAL VEIN OF LOWER EXTREMITY, UNSPECIFIED LATERALITY (HCC): ICD-10-CM

## 2019-09-20 DIAGNOSIS — C82.90 FOLLICULAR NON-HODGKIN'S LYMPHOMA (HCC): ICD-10-CM

## 2019-09-20 LAB — INR PPP: 3.1

## 2019-09-20 PROCEDURE — 85610 PROTHROMBIN TIME: CPT

## 2019-09-20 PROCEDURE — 96523 IRRIG DRUG DELIVERY DEVICE: CPT | Performed by: INTERNAL MEDICINE

## 2019-09-20 RX ORDER — SODIUM CHLORIDE 0.9 % (FLUSH) 0.9 %
10 SYRINGE (ML) INJECTION AS NEEDED
Status: DISCONTINUED | OUTPATIENT
Start: 2019-09-20 | End: 2019-09-21 | Stop reason: HOSPADM

## 2019-09-20 RX ORDER — SODIUM CHLORIDE 0.9 % (FLUSH) 0.9 %
10 SYRINGE (ML) INJECTION AS NEEDED
Status: CANCELLED | OUTPATIENT
Start: 2019-09-20

## 2019-09-20 RX ADMIN — HEPARIN 500 UNITS: 100 SYRINGE at 08:26

## 2019-09-20 RX ADMIN — Medication 30 ML: at 08:25

## 2019-09-27 ENCOUNTER — HOSPITAL ENCOUNTER (OUTPATIENT)
Dept: ONCOLOGY | Facility: HOSPITAL | Age: 79
Discharge: HOME | End: 2019-09-27
Admitting: NURSE PRACTITIONER

## 2019-09-27 ENCOUNTER — LAB (OUTPATIENT)
Dept: LAB | Facility: HOSPITAL | Age: 79
End: 2019-09-27

## 2019-09-27 VITALS
SYSTOLIC BLOOD PRESSURE: 160 MMHG | TEMPERATURE: 98.2 F | HEIGHT: 73 IN | HEART RATE: 84 BPM | RESPIRATION RATE: 18 BRPM | WEIGHT: 300 LBS | DIASTOLIC BLOOD PRESSURE: 82 MMHG | BODY MASS INDEX: 39.76 KG/M2

## 2019-09-27 DIAGNOSIS — I82.5Y9 CHRONIC DEEP VEIN THROMBOSIS (DVT) OF PROXIMAL VEIN OF LOWER EXTREMITY, UNSPECIFIED LATERALITY (HCC): ICD-10-CM

## 2019-09-27 DIAGNOSIS — I82.5Y9 CHRONIC DEEP VEIN THROMBOSIS (DVT) OF PROXIMAL VEIN OF LOWER EXTREMITY, UNSPECIFIED LATERALITY (HCC): Primary | ICD-10-CM

## 2019-09-27 LAB
INR PPP: 2.3
INR PPP: 2.3 (ref 2–3)

## 2019-09-27 PROCEDURE — 85610 PROTHROMBIN TIME: CPT

## 2019-10-25 ENCOUNTER — HOSPITAL ENCOUNTER (OUTPATIENT)
Dept: ONCOLOGY | Facility: HOSPITAL | Age: 79
Discharge: HOME | End: 2019-10-25
Admitting: INTERNAL MEDICINE

## 2019-10-25 ENCOUNTER — LAB (OUTPATIENT)
Dept: LAB | Facility: HOSPITAL | Age: 79
End: 2019-10-25

## 2019-10-25 VITALS
SYSTOLIC BLOOD PRESSURE: 160 MMHG | HEART RATE: 90 BPM | TEMPERATURE: 97.5 F | HEIGHT: 73 IN | BODY MASS INDEX: 39.76 KG/M2 | WEIGHT: 300 LBS | DIASTOLIC BLOOD PRESSURE: 90 MMHG | RESPIRATION RATE: 18 BRPM

## 2019-10-25 DIAGNOSIS — Z45.2 ENCOUNTER FOR CARE RELATED TO VASCULAR ACCESS PORT: Primary | ICD-10-CM

## 2019-10-25 DIAGNOSIS — C82.90 FOLLICULAR NON-HODGKIN'S LYMPHOMA (HCC): ICD-10-CM

## 2019-10-25 DIAGNOSIS — I82.5Y9 CHRONIC DEEP VEIN THROMBOSIS (DVT) OF PROXIMAL VEIN OF LOWER EXTREMITY, UNSPECIFIED LATERALITY (HCC): ICD-10-CM

## 2019-10-25 LAB
INR PPP: 2.4
INR PPP: 2.4 (ref 2–3)

## 2019-10-25 PROCEDURE — 36416 COLLJ CAPILLARY BLOOD SPEC: CPT

## 2019-10-25 PROCEDURE — 85610 PROTHROMBIN TIME: CPT

## 2019-10-25 PROCEDURE — 96523 IRRIG DRUG DELIVERY DEVICE: CPT | Performed by: INTERNAL MEDICINE

## 2019-10-25 RX ORDER — SODIUM CHLORIDE 0.9 % (FLUSH) 0.9 %
10 SYRINGE (ML) INJECTION AS NEEDED
Status: DISCONTINUED | OUTPATIENT
Start: 2019-10-25 | End: 2019-10-26 | Stop reason: HOSPADM

## 2019-10-25 RX ORDER — SODIUM CHLORIDE 0.9 % (FLUSH) 0.9 %
10 SYRINGE (ML) INJECTION AS NEEDED
Status: CANCELLED | OUTPATIENT
Start: 2019-10-25

## 2019-10-25 RX ADMIN — HEPARIN 500 UNITS: 100 SYRINGE at 10:51

## 2019-10-25 RX ADMIN — Medication 30 ML: at 10:50

## 2019-10-28 RX ORDER — PANTOPRAZOLE SODIUM 40 MG/1
TABLET, DELAYED RELEASE ORAL
Qty: 30 TABLET | Refills: 3 | Status: SHIPPED | OUTPATIENT
Start: 2019-10-28

## 2019-11-22 ENCOUNTER — LAB (OUTPATIENT)
Dept: LAB | Facility: HOSPITAL | Age: 79
End: 2019-11-22

## 2019-11-22 ENCOUNTER — HOSPITAL ENCOUNTER (OUTPATIENT)
Dept: ONCOLOGY | Facility: HOSPITAL | Age: 79
Discharge: HOME | End: 2019-11-22
Admitting: INTERNAL MEDICINE

## 2019-11-22 VITALS
TEMPERATURE: 97.9 F | RESPIRATION RATE: 20 BRPM | HEART RATE: 66 BPM | BODY MASS INDEX: 39.63 KG/M2 | WEIGHT: 299 LBS | SYSTOLIC BLOOD PRESSURE: 131 MMHG | DIASTOLIC BLOOD PRESSURE: 86 MMHG | HEIGHT: 73 IN

## 2019-11-22 DIAGNOSIS — Z45.2 ENCOUNTER FOR CARE RELATED TO VASCULAR ACCESS PORT: Primary | ICD-10-CM

## 2019-11-22 DIAGNOSIS — I82.5Y9 CHRONIC DEEP VEIN THROMBOSIS (DVT) OF PROXIMAL VEIN OF LOWER EXTREMITY, UNSPECIFIED LATERALITY (HCC): ICD-10-CM

## 2019-11-22 DIAGNOSIS — C82.90 FOLLICULAR NON-HODGKIN'S LYMPHOMA (HCC): ICD-10-CM

## 2019-11-22 LAB — INR PPP: 3

## 2019-11-22 PROCEDURE — 85610 PROTHROMBIN TIME: CPT

## 2019-11-22 PROCEDURE — 96523 IRRIG DRUG DELIVERY DEVICE: CPT | Performed by: INTERNAL MEDICINE

## 2019-11-22 RX ORDER — SODIUM CHLORIDE 0.9 % (FLUSH) 0.9 %
10 SYRINGE (ML) INJECTION AS NEEDED
OUTPATIENT
Start: 2019-11-22

## 2019-11-22 RX ORDER — HEPARIN SODIUM (PORCINE) LOCK FLUSH IV SOLN 100 UNIT/ML 100 UNIT/ML
500 SOLUTION INTRAVENOUS AS NEEDED
OUTPATIENT
Start: 2019-11-22

## 2019-11-22 RX ORDER — SODIUM CHLORIDE 0.9 % (FLUSH) 0.9 %
10 SYRINGE (ML) INJECTION AS NEEDED
Status: DISCONTINUED | OUTPATIENT
Start: 2019-11-22 | End: 2019-11-23 | Stop reason: HOSPADM

## 2019-11-22 RX ADMIN — HEPARIN 500 UNITS: 100 SYRINGE at 09:46

## 2019-11-22 RX ADMIN — Medication 30 ML: at 09:45

## 2019-12-13 RX ORDER — WARFARIN SODIUM 1 MG/1
1 TABLET ORAL DAILY
Qty: 30 TABLET | Refills: 3 | Status: SHIPPED | OUTPATIENT
Start: 2019-12-13

## 2019-12-16 ENCOUNTER — TELEPHONE (OUTPATIENT)
Dept: ONCOLOGY | Facility: CLINIC | Age: 79
End: 2019-12-16

## 2019-12-16 NOTE — TELEPHONE ENCOUNTER
Shireen Tapia returned call w/ questions regarding Dr. Burris's new location.  He would prefer to follow w/ him.  Appt canceled for 12/18.

## 2019-12-16 NOTE — TELEPHONE ENCOUNTER
Mr. Tapia left message he had questions regarding appt on 12/18 w/ new MD.  Returned call, left message to call back.

## 2019-12-18 ENCOUNTER — APPOINTMENT (OUTPATIENT)
Dept: ONCOLOGY | Facility: HOSPITAL | Age: 79
End: 2019-12-18

## 2019-12-18 ENCOUNTER — APPOINTMENT (OUTPATIENT)
Dept: LAB | Facility: HOSPITAL | Age: 79
End: 2019-12-18

## 2019-12-18 ENCOUNTER — APPOINTMENT (OUTPATIENT)
Dept: ONCOLOGY | Facility: CLINIC | Age: 79
End: 2019-12-18

## 2019-12-23 RX ORDER — POTASSIUM CHLORIDE 20 MEQ/1
TABLET, EXTENDED RELEASE ORAL
Qty: 30 TABLET | Refills: 3 | OUTPATIENT
Start: 2019-12-23

## 2020-01-22 RX ORDER — POTASSIUM CHLORIDE 20 MEQ/1
TABLET, EXTENDED RELEASE ORAL
Qty: 30 TABLET | Refills: 0 | OUTPATIENT
Start: 2020-01-22

## 2020-01-24 RX ORDER — POTASSIUM CHLORIDE 20 MEQ/1
TABLET, EXTENDED RELEASE ORAL
Qty: 30 TABLET | Refills: 0 | OUTPATIENT
Start: 2020-01-24

## 2020-01-27 ENCOUNTER — TELEPHONE (OUTPATIENT)
Dept: ONCOLOGY | Facility: CLINIC | Age: 80
End: 2020-01-27

## 2020-01-27 NOTE — TELEPHONE ENCOUNTER
[facility] calling having processing the potassium chloride. Needs callback to assist in resolving this. Callback at [phone]

## 2020-01-28 NOTE — TELEPHONE ENCOUNTER
Message left for [facility] pharmacy to contact Dr. Burris's office as patient is being seen there.

## 2020-02-13 RX ORDER — WARFARIN SODIUM 3 MG/1
3 TABLET ORAL DAILY
Qty: 30 TABLET | Refills: 2 | OUTPATIENT
Start: 2020-02-13

## 2020-02-24 ENCOUNTER — LAB REQUISITION (OUTPATIENT)
Dept: LAB | Facility: HOSPITAL | Age: 80
End: 2020-02-24

## 2020-02-24 DIAGNOSIS — Z00.00 ENCOUNTER FOR GENERAL ADULT MEDICAL EXAMINATION WITHOUT ABNORMAL FINDINGS: ICD-10-CM

## 2020-02-24 LAB
ALBUMIN SERPL-MCNC: 4 G/DL (ref 3.5–5.2)
ALBUMIN/GLOB SERPL: 2.1 G/DL
ALP SERPL-CCNC: 77 U/L (ref 39–117)
ALT SERPL W P-5'-P-CCNC: 12 U/L (ref 1–41)
ANION GAP SERPL CALCULATED.3IONS-SCNC: 10 MMOL/L (ref 5–15)
AST SERPL-CCNC: 15 U/L (ref 1–40)
BASOPHILS # BLD AUTO: 0 10*3/MM3 (ref 0–0.2)
BASOPHILS NFR BLD AUTO: 0.5 % (ref 0–1.5)
BILIRUB SERPL-MCNC: 0.7 MG/DL (ref 0.2–1.2)
BUN BLD-MCNC: 15 MG/DL (ref 8–23)
BUN/CREAT SERPL: 12.4 (ref 7–25)
CALCIUM SPEC-SCNC: 8.9 MG/DL (ref 8.6–10.5)
CHLORIDE SERPL-SCNC: 105 MMOL/L (ref 98–107)
CO2 SERPL-SCNC: 26 MMOL/L (ref 22–29)
CREAT BLD-MCNC: 1.21 MG/DL (ref 0.76–1.27)
DEPRECATED RDW RBC AUTO: 49.9 FL (ref 37–54)
EOSINOPHIL # BLD AUTO: 0 10*3/MM3 (ref 0–0.4)
EOSINOPHIL NFR BLD AUTO: 0.7 % (ref 0.3–6.2)
ERYTHROCYTE [DISTWIDTH] IN BLOOD BY AUTOMATED COUNT: 15 % (ref 12.3–15.4)
GFR SERPL CREATININE-BSD FRML MDRD: 58 ML/MIN/1.73
GLOBULIN UR ELPH-MCNC: 1.9 GM/DL
GLUCOSE BLD-MCNC: 143 MG/DL (ref 65–99)
HCT VFR BLD AUTO: 39.7 % (ref 37.5–51)
HGB BLD-MCNC: 13.4 G/DL (ref 13–17.7)
LYMPHOCYTES # BLD AUTO: 0.8 10*3/MM3 (ref 0.7–3.1)
LYMPHOCYTES NFR BLD AUTO: 22.5 % (ref 19.6–45.3)
MCH RBC QN AUTO: 32.3 PG (ref 26.6–33)
MCHC RBC AUTO-ENTMCNC: 33.7 G/DL (ref 31.5–35.7)
MCV RBC AUTO: 95.8 FL (ref 79–97)
MONOCYTES # BLD AUTO: 0.2 10*3/MM3 (ref 0.1–0.9)
MONOCYTES NFR BLD AUTO: 4.8 % (ref 5–12)
NEUTROPHILS # BLD AUTO: 2.4 10*3/MM3 (ref 1.7–7)
NEUTROPHILS NFR BLD AUTO: 71.5 % (ref 42.7–76)
NRBC BLD AUTO-RTO: 0.1 /100 WBC (ref 0–0.2)
PLATELET # BLD AUTO: 104 10*3/MM3 (ref 140–450)
PMV BLD AUTO: 8.3 FL (ref 6–12)
POTASSIUM BLD-SCNC: 4.3 MMOL/L (ref 3.5–5.2)
PROT SERPL-MCNC: 5.9 G/DL (ref 6–8.5)
RBC # BLD AUTO: 4.14 10*6/MM3 (ref 4.14–5.8)
SODIUM BLD-SCNC: 141 MMOL/L (ref 136–145)
WBC NRBC COR # BLD: 3.4 10*3/MM3 (ref 3.4–10.8)

## 2020-02-24 PROCEDURE — 85025 COMPLETE CBC W/AUTO DIFF WBC: CPT | Performed by: INTERNAL MEDICINE

## 2020-02-24 PROCEDURE — 80053 COMPREHEN METABOLIC PANEL: CPT | Performed by: INTERNAL MEDICINE

## 2020-02-27 RX ORDER — PANTOPRAZOLE SODIUM 40 MG/1
TABLET, DELAYED RELEASE ORAL
Qty: 30 TABLET | Refills: 2 | OUTPATIENT
Start: 2020-02-27

## 2020-03-09 ENCOUNTER — LAB REQUISITION (OUTPATIENT)
Dept: LAB | Facility: HOSPITAL | Age: 80
End: 2020-03-09

## 2020-03-09 DIAGNOSIS — C85.90 NON-HODGKIN LYMPHOMA, UNSPECIFIED, UNSPECIFIED SITE (HCC): ICD-10-CM

## 2020-03-09 LAB
DEPRECATED RDW RBC AUTO: 48.6 FL (ref 37–54)
EOSINOPHIL # BLD MANUAL: 0.08 10*3/MM3 (ref 0–0.4)
EOSINOPHIL NFR BLD MANUAL: 5 % (ref 0.3–6.2)
ERYTHROCYTE [DISTWIDTH] IN BLOOD BY AUTOMATED COUNT: 14.7 % (ref 12.3–15.4)
HCT VFR BLD AUTO: 36.3 % (ref 37.5–51)
HGB BLD-MCNC: 12.4 G/DL (ref 13–17.7)
LYMPHOCYTES # BLD MANUAL: 0.56 10*3/MM3 (ref 0.7–3.1)
LYMPHOCYTES NFR BLD MANUAL: 37 % (ref 19.6–45.3)
LYMPHOCYTES NFR BLD MANUAL: 38 % (ref 5–12)
MCH RBC QN AUTO: 32.6 PG (ref 26.6–33)
MCHC RBC AUTO-ENTMCNC: 34.3 G/DL (ref 31.5–35.7)
MCV RBC AUTO: 95.3 FL (ref 79–97)
MONOCYTES # BLD AUTO: 0.57 10*3/MM3 (ref 0.1–0.9)
NEUTROPHILS # BLD AUTO: 0.3 10*3/MM3 (ref 1.7–7)
NEUTROPHILS NFR BLD MANUAL: 20 % (ref 42.7–76)
PLAT MORPH BLD: NORMAL
PLATELET # BLD AUTO: 93 10*3/MM3 (ref 140–450)
PMV BLD AUTO: 8.6 FL (ref 6–12)
RBC # BLD AUTO: 3.81 10*6/MM3 (ref 4.14–5.8)
RBC MORPH BLD: NORMAL
SCAN SLIDE: NORMAL
WBC MORPH BLD: NORMAL
WBC NRBC COR # BLD: 1.5 10*3/MM3 (ref 3.4–10.8)

## 2020-03-09 PROCEDURE — 85025 COMPLETE CBC W/AUTO DIFF WBC: CPT | Performed by: NURSE PRACTITIONER

## 2020-03-11 ENCOUNTER — LAB REQUISITION (OUTPATIENT)
Dept: LAB | Facility: HOSPITAL | Age: 80
End: 2020-03-11

## 2020-03-11 DIAGNOSIS — C85.90 NON-HODGKIN LYMPHOMA, UNSPECIFIED, UNSPECIFIED SITE (HCC): ICD-10-CM

## 2020-03-11 LAB
BASOPHILS # BLD AUTO: 0 10*3/MM3 (ref 0–0.2)
BASOPHILS NFR BLD AUTO: 0.6 % (ref 0–1.5)
DEPRECATED RDW RBC AUTO: 48.6 FL (ref 37–54)
EOSINOPHIL # BLD AUTO: 0.1 10*3/MM3 (ref 0–0.4)
EOSINOPHIL NFR BLD AUTO: 2.6 % (ref 0.3–6.2)
ERYTHROCYTE [DISTWIDTH] IN BLOOD BY AUTOMATED COUNT: 14.3 % (ref 12.3–15.4)
HCT VFR BLD AUTO: 37.5 % (ref 37.5–51)
HGB BLD-MCNC: 12.3 G/DL (ref 13–17.7)
LYMPHOCYTES # BLD AUTO: 1.1 10*3/MM3 (ref 0.7–3.1)
LYMPHOCYTES NFR BLD AUTO: 37.4 % (ref 19.6–45.3)
MCH RBC QN AUTO: 31.7 PG (ref 26.6–33)
MCHC RBC AUTO-ENTMCNC: 32.9 G/DL (ref 31.5–35.7)
MCV RBC AUTO: 96.4 FL (ref 79–97)
MONOCYTES # BLD AUTO: 0.5 10*3/MM3 (ref 0.1–0.9)
MONOCYTES NFR BLD AUTO: 17.1 % (ref 5–12)
NEUTROPHILS # BLD AUTO: 1.3 10*3/MM3 (ref 1.7–7)
NEUTROPHILS NFR BLD AUTO: 42.3 % (ref 42.7–76)
NRBC BLD AUTO-RTO: 0.2 /100 WBC (ref 0–0.2)
PLATELET # BLD AUTO: 93 10*3/MM3 (ref 140–450)
PMV BLD AUTO: 8.6 FL (ref 6–12)
RBC # BLD AUTO: 3.89 10*6/MM3 (ref 4.14–5.8)
WBC NRBC COR # BLD: 3 10*3/MM3 (ref 3.4–10.8)

## 2020-03-11 PROCEDURE — 85025 COMPLETE CBC W/AUTO DIFF WBC: CPT | Performed by: NURSE PRACTITIONER

## 2020-03-12 ENCOUNTER — LAB REQUISITION (OUTPATIENT)
Dept: LAB | Facility: HOSPITAL | Age: 80
End: 2020-03-12

## 2020-03-12 DIAGNOSIS — D70.9 NEUTROPENIA, UNSPECIFIED (HCC): ICD-10-CM

## 2020-03-12 LAB
ANISOCYTOSIS BLD QL: ABNORMAL
BASOPHILS # BLD MANUAL: 0.06 10*3/MM3 (ref 0–0.2)
BASOPHILS NFR BLD AUTO: 1 % (ref 0–1.5)
DACRYOCYTES BLD QL SMEAR: ABNORMAL
DEPRECATED RDW RBC AUTO: 51.2 FL (ref 37–54)
EOSINOPHIL # BLD MANUAL: 0.11 10*3/MM3 (ref 0–0.4)
EOSINOPHIL NFR BLD MANUAL: 2 % (ref 0.3–6.2)
ERYTHROCYTE [DISTWIDTH] IN BLOOD BY AUTOMATED COUNT: 15.2 % (ref 12.3–15.4)
HCT VFR BLD AUTO: 36.8 % (ref 37.5–51)
HGB BLD-MCNC: 12.7 G/DL (ref 13–17.7)
LYMPHOCYTES # BLD MANUAL: 1.29 10*3/MM3 (ref 0.7–3.1)
LYMPHOCYTES NFR BLD MANUAL: 23 % (ref 19.6–45.3)
LYMPHOCYTES NFR BLD MANUAL: 8 % (ref 5–12)
MCH RBC QN AUTO: 33.5 PG (ref 26.6–33)
MCHC RBC AUTO-ENTMCNC: 34.5 G/DL (ref 31.5–35.7)
MCV RBC AUTO: 97 FL (ref 79–97)
MONOCYTES # BLD AUTO: 0.45 10*3/MM3 (ref 0.1–0.9)
NEUTROPHILS # BLD AUTO: 3.47 10*3/MM3 (ref 1.7–7)
NEUTROPHILS NFR BLD MANUAL: 44 % (ref 42.7–76)
NEUTS BAND NFR BLD MANUAL: 18 % (ref 0–5)
PLAT MORPH BLD: NORMAL
PLATELET # BLD AUTO: 84 10*3/MM3 (ref 140–450)
PMV BLD AUTO: 8.9 FL (ref 6–12)
POIKILOCYTOSIS BLD QL SMEAR: ABNORMAL
RBC # BLD AUTO: 3.79 10*6/MM3 (ref 4.14–5.8)
SCAN SLIDE: NORMAL
TOXIC GRANULATION: ABNORMAL
VARIANT LYMPHS NFR BLD MANUAL: 4 % (ref 0–5)
WBC NRBC COR # BLD: 5.6 10*3/MM3 (ref 3.4–10.8)

## 2020-03-12 PROCEDURE — 85025 COMPLETE CBC W/AUTO DIFF WBC: CPT | Performed by: INTERNAL MEDICINE

## 2020-03-16 ENCOUNTER — LAB REQUISITION (OUTPATIENT)
Dept: LAB | Facility: HOSPITAL | Age: 80
End: 2020-03-16

## 2020-03-16 DIAGNOSIS — C85.90 NON-HODGKIN LYMPHOMA, UNSPECIFIED, UNSPECIFIED SITE (HCC): ICD-10-CM

## 2020-03-16 LAB
DEPRECATED RDW RBC AUTO: 50.3 FL (ref 37–54)
EOSINOPHIL # BLD MANUAL: 0.05 10*3/MM3 (ref 0–0.4)
EOSINOPHIL NFR BLD MANUAL: 1 % (ref 0.3–6.2)
ERYTHROCYTE [DISTWIDTH] IN BLOOD BY AUTOMATED COUNT: 15.2 % (ref 12.3–15.4)
GIANT PLATELETS: ABNORMAL
HCT VFR BLD AUTO: 38.3 % (ref 37.5–51)
HGB BLD-MCNC: 12.7 G/DL (ref 13–17.7)
LYMPHOCYTES # BLD MANUAL: 0.99 10*3/MM3 (ref 0.7–3.1)
LYMPHOCYTES NFR BLD MANUAL: 11 % (ref 5–12)
LYMPHOCYTES NFR BLD MANUAL: 22 % (ref 19.6–45.3)
MCH RBC QN AUTO: 31.8 PG (ref 26.6–33)
MCHC RBC AUTO-ENTMCNC: 33.3 G/DL (ref 31.5–35.7)
MCV RBC AUTO: 95.6 FL (ref 79–97)
METAMYELOCYTES NFR BLD MANUAL: 1 % (ref 0–0)
MONOCYTES # BLD AUTO: 0.5 10*3/MM3 (ref 0.1–0.9)
MYELOCYTES NFR BLD MANUAL: 1 % (ref 0–0)
NEUTROPHILS # BLD AUTO: 2.57 10*3/MM3 (ref 1.7–7)
NEUTROPHILS NFR BLD MANUAL: 47 % (ref 42.7–76)
NEUTS BAND NFR BLD MANUAL: 10 % (ref 0–5)
NRBC SPEC MANUAL: 2 /100 WBC (ref 0–0.2)
PLATELET # BLD AUTO: 106 10*3/MM3 (ref 140–450)
PMV BLD AUTO: 9.1 FL (ref 6–12)
RBC # BLD AUTO: 4 10*6/MM3 (ref 4.14–5.8)
RBC MORPH BLD: NORMAL
SCAN SLIDE: NORMAL
TOXIC GRANULATION: ABNORMAL
VARIANT LYMPHS NFR BLD MANUAL: 7 % (ref 0–5)
WBC NRBC COR # BLD: 4.5 10*3/MM3 (ref 3.4–10.8)

## 2020-03-16 PROCEDURE — 85025 COMPLETE CBC W/AUTO DIFF WBC: CPT | Performed by: INTERNAL MEDICINE

## 2020-03-26 RX ORDER — LANOLIN ALCOHOL/MO/W.PET/CERES
CREAM (GRAM) TOPICAL
Qty: 30 TABLET | Refills: 6 | OUTPATIENT
Start: 2020-03-26

## 2020-03-27 RX ORDER — LANOLIN ALCOHOL/MO/W.PET/CERES
CREAM (GRAM) TOPICAL
Qty: 30 TABLET | Refills: 6 | OUTPATIENT
Start: 2020-03-27